# Patient Record
Sex: FEMALE | Race: WHITE | NOT HISPANIC OR LATINO
[De-identification: names, ages, dates, MRNs, and addresses within clinical notes are randomized per-mention and may not be internally consistent; named-entity substitution may affect disease eponyms.]

---

## 2017-02-04 ENCOUNTER — APPOINTMENT (OUTPATIENT)
Dept: ULTRASOUND IMAGING | Facility: CLINIC | Age: 57
End: 2017-02-04

## 2017-02-04 ENCOUNTER — OUTPATIENT (OUTPATIENT)
Dept: OUTPATIENT SERVICES | Facility: HOSPITAL | Age: 57
LOS: 1 days | End: 2017-02-04
Payer: COMMERCIAL

## 2017-02-04 DIAGNOSIS — Z00.8 ENCOUNTER FOR OTHER GENERAL EXAMINATION: ICD-10-CM

## 2017-02-04 PROCEDURE — 76536 US EXAM OF HEAD AND NECK: CPT

## 2017-05-18 ENCOUNTER — APPOINTMENT (OUTPATIENT)
Dept: OBGYN | Facility: CLINIC | Age: 57
End: 2017-05-18

## 2017-05-18 VITALS
BODY MASS INDEX: 25.52 KG/M2 | WEIGHT: 149.5 LBS | SYSTOLIC BLOOD PRESSURE: 122 MMHG | HEIGHT: 64 IN | DIASTOLIC BLOOD PRESSURE: 80 MMHG

## 2017-05-18 DIAGNOSIS — N95.1 MENOPAUSAL AND FEMALE CLIMACTERIC STATES: ICD-10-CM

## 2018-05-16 ENCOUNTER — OTHER (OUTPATIENT)
Age: 58
End: 2018-05-16

## 2018-05-24 ENCOUNTER — APPOINTMENT (OUTPATIENT)
Dept: OBGYN | Facility: CLINIC | Age: 58
End: 2018-05-24
Payer: COMMERCIAL

## 2018-05-24 VITALS
BODY MASS INDEX: 26.16 KG/M2 | DIASTOLIC BLOOD PRESSURE: 80 MMHG | WEIGHT: 153.25 LBS | HEIGHT: 64 IN | SYSTOLIC BLOOD PRESSURE: 140 MMHG

## 2018-05-24 PROCEDURE — 99396 PREV VISIT EST AGE 40-64: CPT

## 2018-05-24 RX ORDER — AMOXICILLIN AND CLAVULANATE POTASSIUM 875; 125 MG/1; MG/1
875-125 TABLET, COATED ORAL
Qty: 20 | Refills: 0 | Status: DISCONTINUED | COMMUNITY
Start: 2017-01-25

## 2018-05-24 RX ORDER — METRONIDAZOLE 500 MG/1
500 TABLET ORAL
Qty: 15 | Refills: 0 | Status: DISCONTINUED | COMMUNITY
Start: 2017-02-11

## 2018-05-25 LAB — HPV HIGH+LOW RISK DNA PNL CVX: NOT DETECTED

## 2018-05-31 ENCOUNTER — APPOINTMENT (OUTPATIENT)
Dept: GASTROENTEROLOGY | Facility: CLINIC | Age: 58
End: 2018-05-31
Payer: COMMERCIAL

## 2018-05-31 VITALS
BODY MASS INDEX: 26.46 KG/M2 | OXYGEN SATURATION: 99 % | WEIGHT: 155 LBS | HEIGHT: 64 IN | DIASTOLIC BLOOD PRESSURE: 78 MMHG | HEART RATE: 69 BPM | SYSTOLIC BLOOD PRESSURE: 140 MMHG

## 2018-05-31 PROCEDURE — 99204 OFFICE O/P NEW MOD 45 MIN: CPT

## 2018-06-01 LAB — CYTOLOGY CVX/VAG DOC THIN PREP: NORMAL

## 2018-07-02 ENCOUNTER — NON-APPOINTMENT (OUTPATIENT)
Age: 58
End: 2018-07-02

## 2018-07-02 ENCOUNTER — APPOINTMENT (OUTPATIENT)
Dept: CARDIOLOGY | Facility: CLINIC | Age: 58
End: 2018-07-02
Payer: COMMERCIAL

## 2018-07-02 VITALS — SYSTOLIC BLOOD PRESSURE: 150 MMHG | DIASTOLIC BLOOD PRESSURE: 77 MMHG | HEART RATE: 70 BPM | OXYGEN SATURATION: 100 %

## 2018-07-02 PROCEDURE — 93000 ELECTROCARDIOGRAM COMPLETE: CPT

## 2018-07-02 PROCEDURE — 99204 OFFICE O/P NEW MOD 45 MIN: CPT

## 2018-07-11 ENCOUNTER — APPOINTMENT (OUTPATIENT)
Dept: GASTROENTEROLOGY | Facility: AMBULATORY MEDICAL SERVICES | Age: 58
End: 2018-07-11
Payer: COMMERCIAL

## 2018-07-11 PROCEDURE — 45378 DIAGNOSTIC COLONOSCOPY: CPT

## 2018-10-15 ENCOUNTER — LABORATORY RESULT (OUTPATIENT)
Age: 58
End: 2018-10-15

## 2018-10-15 ENCOUNTER — NON-APPOINTMENT (OUTPATIENT)
Age: 58
End: 2018-10-15

## 2018-10-15 ENCOUNTER — APPOINTMENT (OUTPATIENT)
Dept: PULMONOLOGY | Facility: CLINIC | Age: 58
End: 2018-10-15
Payer: COMMERCIAL

## 2018-10-15 VITALS
SYSTOLIC BLOOD PRESSURE: 144 MMHG | DIASTOLIC BLOOD PRESSURE: 87 MMHG | BODY MASS INDEX: 26.46 KG/M2 | HEIGHT: 64 IN | HEART RATE: 75 BPM | TEMPERATURE: 98.3 F | OXYGEN SATURATION: 100 % | WEIGHT: 155 LBS | RESPIRATION RATE: 14 BRPM

## 2018-10-15 VITALS — SYSTOLIC BLOOD PRESSURE: 134 MMHG | DIASTOLIC BLOOD PRESSURE: 88 MMHG

## 2018-10-15 DIAGNOSIS — Z23 ENCOUNTER FOR IMMUNIZATION: ICD-10-CM

## 2018-10-15 DIAGNOSIS — Z87.39 PERSONAL HISTORY OF OTHER DISEASES OF THE MUSCULOSKELETAL SYSTEM AND CONNECTIVE TISSUE: ICD-10-CM

## 2018-10-15 LAB
BILIRUB UR QL STRIP: NEGATIVE
CLARITY UR: CLEAR
COLLECTION METHOD: NORMAL
GLUCOSE UR-MCNC: NEGATIVE
HCG UR QL: 0.2 EU/DL
HGB UR QL STRIP.AUTO: NEGATIVE
KETONES UR-MCNC: NEGATIVE
LEUKOCYTE ESTERASE UR QL STRIP: NEGATIVE
NITRITE UR QL STRIP: NEGATIVE
PH UR STRIP: 5.5
PROT UR STRIP-MCNC: NEGATIVE
SP GR UR STRIP: 1.02

## 2018-10-15 PROCEDURE — 93000 ELECTROCARDIOGRAM COMPLETE: CPT

## 2018-10-15 PROCEDURE — 99396 PREV VISIT EST AGE 40-64: CPT | Mod: 25

## 2018-10-15 PROCEDURE — G0008: CPT

## 2018-10-15 PROCEDURE — 77080 DXA BONE DENSITY AXIAL: CPT

## 2018-10-15 PROCEDURE — 36415 COLL VENOUS BLD VENIPUNCTURE: CPT

## 2018-10-15 PROCEDURE — 81003 URINALYSIS AUTO W/O SCOPE: CPT | Mod: QW

## 2018-10-15 PROCEDURE — 90686 IIV4 VACC NO PRSV 0.5 ML IM: CPT

## 2018-10-15 RX ORDER — SODIUM SULFATE, POTASSIUM SULFATE, MAGNESIUM SULFATE 17.5; 3.13; 1.6 G/ML; G/ML; G/ML
17.5-3.13-1.6 SOLUTION, CONCENTRATE ORAL
Qty: 1 | Refills: 0 | Status: DISCONTINUED | COMMUNITY
Start: 2018-05-31 | End: 2018-10-15

## 2018-10-16 LAB
25(OH)D3 SERPL-MCNC: 49.8 NG/ML
ALBUMIN SERPL ELPH-MCNC: 4.8 G/DL
ALP BLD-CCNC: 53 U/L
ALT SERPL-CCNC: 14 U/L
ANION GAP SERPL CALC-SCNC: 13 MMOL/L
AST SERPL-CCNC: 21 U/L
BASOPHILS # BLD AUTO: 0.03 K/UL
BASOPHILS NFR BLD AUTO: 0.4 %
BILIRUB DIRECT SERPL-MCNC: 0.1 MG/DL
BILIRUB INDIRECT SERPL-MCNC: 0.4 MG/DL
BILIRUB SERPL-MCNC: 0.5 MG/DL
BUN SERPL-MCNC: 19 MG/DL
CALCIUM SERPL-MCNC: 10 MG/DL
CHLORIDE SERPL-SCNC: 103 MMOL/L
CHOLEST SERPL-MCNC: 227 MG/DL
CHOLEST/HDLC SERPL: 2.5 RATIO
CO2 SERPL-SCNC: 25 MMOL/L
CREAT SERPL-MCNC: 0.8 MG/DL
EOSINOPHIL # BLD AUTO: 0.2 K/UL
EOSINOPHIL NFR BLD AUTO: 3 %
GLUCOSE SERPL-MCNC: 90 MG/DL
HBA1C MFR BLD HPLC: 5.4 %
HCT VFR BLD CALC: 44.1 %
HCV AB SER QL: NONREACTIVE
HCV S/CO RATIO: 0.27 S/CO
HDLC SERPL-MCNC: 91 MG/DL
HGB BLD-MCNC: 14.6 G/DL
IMM GRANULOCYTES NFR BLD AUTO: 0.1 %
LDLC SERPL CALC-MCNC: 118 MG/DL
LYMPHOCYTES # BLD AUTO: 2.68 K/UL
LYMPHOCYTES NFR BLD AUTO: 39.6 %
MAN DIFF?: NORMAL
MCHC RBC-ENTMCNC: 29.4 PG
MCHC RBC-ENTMCNC: 33.1 GM/DL
MCV RBC AUTO: 88.9 FL
MONOCYTES # BLD AUTO: 0.55 K/UL
MONOCYTES NFR BLD AUTO: 8.1 %
NEUTROPHILS # BLD AUTO: 3.29 K/UL
NEUTROPHILS NFR BLD AUTO: 48.8 %
PLATELET # BLD AUTO: 460 K/UL
POTASSIUM SERPL-SCNC: 4.5 MMOL/L
PROT SERPL-MCNC: 7.7 G/DL
RBC # BLD: 4.96 M/UL
RBC # FLD: 14.5 %
SODIUM SERPL-SCNC: 141 MMOL/L
T3 SERPL-MCNC: 110 NG/DL
T3RU NFR SERPL: 1 INDEX
T4 FREE SERPL-MCNC: 1.4 NG/DL
T4 SERPL-MCNC: 7.3 UG/DL
TRIGL SERPL-MCNC: 91 MG/DL
TSH SERPL-ACNC: 3.85 UIU/ML
WBC # FLD AUTO: 6.76 K/UL

## 2019-05-17 ENCOUNTER — OTHER (OUTPATIENT)
Age: 59
End: 2019-05-17

## 2019-05-29 ENCOUNTER — TRANSCRIPTION ENCOUNTER (OUTPATIENT)
Age: 59
End: 2019-05-29

## 2019-05-29 ENCOUNTER — APPOINTMENT (OUTPATIENT)
Dept: OBGYN | Facility: CLINIC | Age: 59
End: 2019-05-29
Payer: COMMERCIAL

## 2019-05-29 VITALS
DIASTOLIC BLOOD PRESSURE: 80 MMHG | SYSTOLIC BLOOD PRESSURE: 110 MMHG | HEIGHT: 64 IN | BODY MASS INDEX: 27.17 KG/M2 | WEIGHT: 159.13 LBS

## 2019-05-29 PROCEDURE — 99396 PREV VISIT EST AGE 40-64: CPT

## 2019-05-29 NOTE — HISTORY OF PRESENT ILLNESS
[Last Mammogram ___] : Last Mammogram was [unfilled] [Last Bone Density ___] : Last bone density studies [unfilled] [Last Colonoscopy ___] : Last colonoscopy [unfilled] [Last Pap ___] : Last cervical pap smear was [unfilled]

## 2019-08-10 ENCOUNTER — TRANSCRIPTION ENCOUNTER (OUTPATIENT)
Age: 59
End: 2019-08-10

## 2019-10-09 ENCOUNTER — APPOINTMENT (OUTPATIENT)
Dept: ORTHOPEDIC SURGERY | Facility: CLINIC | Age: 59
End: 2019-10-09
Payer: COMMERCIAL

## 2019-10-09 DIAGNOSIS — Z78.9 OTHER SPECIFIED HEALTH STATUS: ICD-10-CM

## 2019-10-09 DIAGNOSIS — M25.562 PAIN IN LEFT KNEE: ICD-10-CM

## 2019-10-09 PROCEDURE — 99203 OFFICE O/P NEW LOW 30 MIN: CPT

## 2019-10-09 PROCEDURE — 73560 X-RAY EXAM OF KNEE 1 OR 2: CPT | Mod: RT

## 2019-10-09 PROCEDURE — 73564 X-RAY EXAM KNEE 4 OR MORE: CPT | Mod: LT

## 2019-10-09 NOTE — ADDENDUM
[FreeTextEntry1] : This note was written by Carla Merrill on 10/09/2019 acting as scribe for Dr. Parvez Eckert M.D.\par \par I, Dr. Parvez Eckert M.D., have read and attest that all the information, medical decision making and discharge instructions within are true and accurate.\par

## 2019-10-09 NOTE — HISTORY OF PRESENT ILLNESS
[de-identified] : 59 year old female presents for initial evaluation of left knee pain left knee symptoms for about a year. Patient denies any specific injury. She describes the pain as located laterally, sharp with increased pain on stairs,when squatting and when bending. She does report swelling  but no other symptoms. She walks as needed and takes the stairs one at a time using the handrail. She currently takes Advil or Aleve prn and uses a warm compress with some improvement. She has seen Dr. Strickland in Feb 2019 and was given a cortisone injection to left knee with improvement. She also does exercise with walking and using the elliptical. \par

## 2019-10-09 NOTE — DISCUSSION/SUMMARY
[de-identified] : Discussed at length the nature of the patient’s condition. 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 At this point I think that the best option is to begin a course of physiotherapy following the anterior knee pain program and focusing on flexibility. I have recommended weight loss, Yoga and Pilates, as well as daily stretching. She may take Advil prn and continue her normal activities as tolerated. If she continues to be symptomatic she may return for follow up in 6-8 weeks.

## 2019-10-09 NOTE — PHYSICAL EXAM
[de-identified] : General appearance: well nourished and hydrated, pleasant, alert and oriented x 3, cooperative.\par HEENT: Normocephalic, EOM intact, Nasal septum midline, Oral cavity clear, External auditory canal clear.\par Cardiovascular: no apparent abnormalities, mild bilateral lower leg edema, no varicosities, pedal pulses are palpable.\par Lymphatics Lymph nodes: none palpated, Lymphedema: not present.\par Neurologic: sensation is normal, no muscle weakness in upper or lower extremities, patella tendon reflexes intact .\par Dermatologic no apparent skin lesions, moist, warm, no rash.\par Spine: cervical spine appears normal and moves freely, thoracic spine appears normal and moves freely, lumbosacral spine appears normal and moves freely.\par Gait: nonantalgic.\par \par Left knee\par Inspection: no effusion or erythema.\par Wounds: none.\par Alignment: normal.\par Palpation: no specific tenderness on palpation.\par ROM active (in  0-135 with patellofemoral clicking \par Ligamentous laxity: all ligaments appear stable,, negative ant. drawer test, negative post. drawer test, stable to varus stress test, stable to valgus stress test. negative Lachman's test, negative pivot shift test\par Meniscal Test: negative McMurrays, negative Kyleigh.\par Patellofemoral Alignment Test: Q angle-, normal.\par Muscle Test: good quad strength.\par Leg examination: calf is soft and non-tender.\par tight hamstring, popliteal angle 60 degrees, positive Daniel test, tight IT band\par \par Right knee\par Inspection: no effusion or erythema.\par Wounds: none.\par Alignment: normal.\par Palpation: no specific tenderness on palpation.\par ROM active (in degrees): 0-135 with patellofemoral clicking \par Ligamentous laxity: all ligaments appear stable,, negative ant. drawer test, negative post. drawer test, stable to varus stress test, stable to valgus stress test. negative Lachman's test, negative pivot shift test\par Meniscal Test: negative McMurrays, negative Kyleigh.\par Patellofemoral Alignment Test: Q angle-, normal.\par Muscle Test: good quad strength.\par Leg examination: calf is soft and non-tender.\par tight hamstring, popliteal angle 60 degrees, positive Daniel test, tight IT band \par \par Left hip\par Inspection: No swelling or ecchymosis.\par Wounds: none.\par Palpation: non-tender.\par Stability: no instability.\par Strength: 5/5 all motor groups.\par ROM: no pain with FROM.\par Leg length: equal.\par \par Right hip\par Inspection: No swelling or ecchymosis.\par Wounds: none.\par Palpation: non-tender.\par Stability: no instability.\par Strength: 5/5 all motor groups.\par ROM: no pain with FROM.\par Leg length: equal.\par \par Left ankle\par Inspection: no erythema noted, no swelling noted.\par Palpation: no pain on palpation .\par ROM: FROM without crepitus.\par Muscle strength: 5/5.\par Stability: no instability noted.\par \par Right ankle\par Inspection: no erythema noted, no swelling noted.\par ROM: FROM without crepitus.\par Palpation: no pain on palpation .\par Muscle strength: 5/5.\par Stability: no instability noted.\par \par Left foot\par Inspection: color, texture and turgor are normal.\par ROM: full range of motion of all joints without pain or crepitus.\par Palpation: no tenderness.\par Stability: no instability noted.\par \par Right foot\par Inspection: color, texture and turgor are normal.\par ROM: full range of motion of all joints without pain or crepitus.\par Palpation: no tenderness.\par Stability: no instability noted. [de-identified] : Left knee xrays, standing AP/Lateral, Merchant, and 45 degree PA standing view, taken at the office today shows normal alignment, good joint space maintained, patella sits at an appropriate height in a central position\par \par Right knee xray merchant view, taken at the office today demonstrates good joint space and a well centered patella. \par \par

## 2019-10-25 ENCOUNTER — APPOINTMENT (OUTPATIENT)
Dept: PULMONOLOGY | Facility: CLINIC | Age: 59
End: 2019-10-25

## 2019-11-20 ENCOUNTER — APPOINTMENT (OUTPATIENT)
Dept: ORTHOPEDIC SURGERY | Facility: CLINIC | Age: 59
End: 2019-11-20
Payer: COMMERCIAL

## 2019-11-20 DIAGNOSIS — S83.282A OTHER TEAR OF LATERAL MENISCUS, CURRENT INJURY, LEFT KNEE, INITIAL ENCOUNTER: ICD-10-CM

## 2019-11-20 DIAGNOSIS — M22.42 CHONDROMALACIA PATELLAE, LEFT KNEE: ICD-10-CM

## 2019-11-20 PROCEDURE — 73560 X-RAY EXAM OF KNEE 1 OR 2: CPT | Mod: RT

## 2019-11-20 PROCEDURE — 73564 X-RAY EXAM KNEE 4 OR MORE: CPT | Mod: LT

## 2019-11-20 PROCEDURE — 99214 OFFICE O/P EST MOD 30 MIN: CPT

## 2019-11-20 NOTE — ADDENDUM
[FreeTextEntry1] : This note was written by Teri Moreno on 11/20/2019 acting as scribe for Dr. Parvez Eckert M.D.\par \par I, Dr. Parvez Eckert M.D., have read and attest that all the information, medical decision making and discharge instructions within are true and accurate.

## 2019-11-20 NOTE — PHYSICAL EXAM
[de-identified] : Left knee\par Inspection: no effusion or erythema, soft tissue swelling noted along the lateral joint line consistent with possible synovial cyst\par Wounds: none.\par Alignment: normal.\par Palpation: no specific tenderness on palpation.\par ROM active (in  0-135 with patellofemoral clicking \par Ligamentous laxity: all ligaments appear stable,, negative ant. drawer test, negative post. drawer test, stable to varus stress test, stable to valgus stress test. negative Lachman's test, negative pivot shift test\par Meniscal Test: negative McMurrays, negative Kyleigh.\par Patellofemoral Alignment Test: Q angle-, normal.\par Muscle Test: good quad strength.\par Leg examination: calf is soft and non-tender.\par tight hamstring, popliteal angle 90 degrees, positive Daniel test, tight IT band  [de-identified] : Left knee xrays, standing AP/Lateral, Merchant, and 45 degree PA standing view, taken at the office today shows normal alignment, good joint space maintained, patella sits at an appropriate height in a central position\par \par Right knee xray merchant view, taken at the office today demonstrates good joint space and a well centered patella. \par \par

## 2019-11-20 NOTE — HISTORY OF PRESENT ILLNESS
[de-identified] : 59 year old female presents for follow up evaluation of left knee pain. She has been doing PT for 4 weeks and denies using any medications. Patient feels she has had some improvement but continues to report pain with transfers from a low seat. She locates most of her pain in the front of her knee with squatting as well as stairs.

## 2019-12-04 ENCOUNTER — OUTPATIENT (OUTPATIENT)
Dept: OUTPATIENT SERVICES | Facility: HOSPITAL | Age: 59
LOS: 1 days | End: 2019-12-04
Payer: COMMERCIAL

## 2019-12-04 ENCOUNTER — APPOINTMENT (OUTPATIENT)
Dept: MRI IMAGING | Facility: CLINIC | Age: 59
End: 2019-12-04
Payer: COMMERCIAL

## 2019-12-04 DIAGNOSIS — M25.562 PAIN IN LEFT KNEE: ICD-10-CM

## 2019-12-04 PROCEDURE — 73721 MRI JNT OF LWR EXTRE W/O DYE: CPT

## 2019-12-04 PROCEDURE — 73721 MRI JNT OF LWR EXTRE W/O DYE: CPT | Mod: 26,LT

## 2019-12-09 ENCOUNTER — RESULT REVIEW (OUTPATIENT)
Age: 59
End: 2019-12-09

## 2019-12-16 ENCOUNTER — LABORATORY RESULT (OUTPATIENT)
Age: 59
End: 2019-12-16

## 2019-12-16 ENCOUNTER — APPOINTMENT (OUTPATIENT)
Dept: PULMONOLOGY | Facility: CLINIC | Age: 59
End: 2019-12-16
Payer: COMMERCIAL

## 2019-12-16 ENCOUNTER — NON-APPOINTMENT (OUTPATIENT)
Age: 59
End: 2019-12-16

## 2019-12-16 VITALS — SYSTOLIC BLOOD PRESSURE: 140 MMHG | DIASTOLIC BLOOD PRESSURE: 80 MMHG | HEART RATE: 70 BPM | OXYGEN SATURATION: 100 %

## 2019-12-16 DIAGNOSIS — Z86.010 PERSONAL HISTORY OF COLONIC POLYPS: ICD-10-CM

## 2019-12-16 LAB
ALBUMIN: 10
BILIRUB UR QL STRIP: NORMAL
CLARITY UR: CLEAR
COLLECTION METHOD: NORMAL
CREATININE: 50
GLUCOSE UR-MCNC: NORMAL
HCG UR QL: 0.2 EU/DL
HGB UR QL STRIP.AUTO: NORMAL
KETONES UR-MCNC: NORMAL
LEUKOCYTE ESTERASE UR QL STRIP: NORMAL
MICROALBUMIN/CREAT UR TEST STR-RTO: 30
NITRITE UR QL STRIP: NORMAL
PH UR STRIP: 5.5
PROT UR STRIP-MCNC: NORMAL
SP GR UR STRIP: 1

## 2019-12-16 PROCEDURE — 82044 UR ALBUMIN SEMIQUANTITATIVE: CPT | Mod: QW

## 2019-12-16 PROCEDURE — 81003 URINALYSIS AUTO W/O SCOPE: CPT | Mod: QW

## 2019-12-16 PROCEDURE — 99396 PREV VISIT EST AGE 40-64: CPT | Mod: 25

## 2019-12-16 PROCEDURE — 93000 ELECTROCARDIOGRAM COMPLETE: CPT

## 2019-12-16 PROCEDURE — 36415 COLL VENOUS BLD VENIPUNCTURE: CPT

## 2019-12-16 NOTE — PHYSICAL EXAM
[General Appearance - Well Developed] : well developed [General Appearance - Well Nourished] : well nourished [Normal Oropharynx] : normal oropharynx [Normal Conjunctiva] : the conjunctiva exhibited no abnormalities [Thyroid Nodule] : there were no palpable thyroid nodules [Jugular Venous Distention Increased] : there was no jugular-venous distention [Thyroid Diffuse Enlargement] : the thyroid was not enlarged [Murmurs] : no murmurs present [Heart Sounds] : normal S1 and S2 [Auscultation Breath Sounds / Voice Sounds] : lungs were clear to auscultation bilaterally [Exaggerated Use Of Accessory Muscles For Inspiration] : no accessory muscle use [Respiration, Rhythm And Depth] : normal respiratory rhythm and effort [Abdomen Tenderness] : non-tender [Abdomen Soft] : soft [Nail Clubbing] : no clubbing of the fingernails [Cyanosis, Localized] : no localized cyanosis [Abdomen Mass (___ Cm)] : no abdominal mass palpated [] : no rash [Skin Color & Pigmentation] : normal skin color and pigmentation [Petechial Hemorrhages (___cm)] : no petechial hemorrhages [No Venous Stasis] : no venous stasis [Skin Lesions] : no skin lesions [Deep Tendon Reflexes (DTR)] : deep tendon reflexes were 2+ and symmetric [No Skin Ulcers] : no skin ulcer [No Xanthoma] : no  xanthoma was observed [Sensation] : the sensory exam was normal to light touch and pinprick [No Focal Deficits] : no focal deficits [FreeTextEntry2] : trace edema left

## 2019-12-16 NOTE — HISTORY OF PRESENT ILLNESS
[FreeTextEntry1] : feeling well. Prn palpitations less often . Had coloscopy over summer. Saw GYN and had mammography\par Saw cardiologist and started to take b/p at home over summer and was told the readings were ok\par BD  last year. \par \par optho Y\par Derm Y\par \par Had varicose vein procedure this summer Dr. Krause on left.

## 2019-12-16 NOTE — ASSESSMENT
[FreeTextEntry1] : Consider low dose beta blocker\par To follow BP home\par Labs drawn in office today\par \par

## 2019-12-17 ENCOUNTER — OTHER (OUTPATIENT)
Age: 59
End: 2019-12-17

## 2019-12-17 ENCOUNTER — MOBILE ON CALL (OUTPATIENT)
Age: 59
End: 2019-12-17

## 2019-12-17 LAB
25(OH)D3 SERPL-MCNC: 56 NG/ML
ALBUMIN SERPL ELPH-MCNC: 4.9 G/DL
ALP BLD-CCNC: 55 U/L
ALT SERPL-CCNC: 33 U/L
ANION GAP SERPL CALC-SCNC: 12 MMOL/L
AST SERPL-CCNC: 26 U/L
BASOPHILS # BLD AUTO: 0.05 K/UL
BASOPHILS NFR BLD AUTO: 0.6 %
BILIRUB DIRECT SERPL-MCNC: 0.1 MG/DL
BILIRUB INDIRECT SERPL-MCNC: 0.1 MG/DL
BILIRUB SERPL-MCNC: 0.2 MG/DL
BUN SERPL-MCNC: 21 MG/DL
CALCIUM SERPL-MCNC: 10 MG/DL
CHLORIDE SERPL-SCNC: 104 MMOL/L
CHOLEST SERPL-MCNC: 231 MG/DL
CHOLEST/HDLC SERPL: 2.1 RATIO
CO2 SERPL-SCNC: 26 MMOL/L
CREAT SERPL-MCNC: 0.67 MG/DL
EOSINOPHIL # BLD AUTO: 0.24 K/UL
EOSINOPHIL NFR BLD AUTO: 3 %
ESTIMATED AVERAGE GLUCOSE: 108 MG/DL
GLUCOSE SERPL-MCNC: 96 MG/DL
HBA1C MFR BLD HPLC: 5.4 %
HCT VFR BLD CALC: 44.3 %
HCV AB SER QL: NONREACTIVE
HCV S/CO RATIO: 0.19 S/CO
HDLC SERPL-MCNC: 111 MG/DL
HGB BLD-MCNC: 14 G/DL
IMM GRANULOCYTES NFR BLD AUTO: 0.2 %
LDLC SERPL CALC-MCNC: 108 MG/DL
LYMPHOCYTES # BLD AUTO: 3.4 K/UL
LYMPHOCYTES NFR BLD AUTO: 41.8 %
MAN DIFF?: NORMAL
MCHC RBC-ENTMCNC: 28.2 PG
MCHC RBC-ENTMCNC: 31.6 GM/DL
MCV RBC AUTO: 89.3 FL
MONOCYTES # BLD AUTO: 0.84 K/UL
MONOCYTES NFR BLD AUTO: 10.3 %
NEUTROPHILS # BLD AUTO: 3.58 K/UL
NEUTROPHILS NFR BLD AUTO: 44.1 %
PLATELET # BLD AUTO: 504 K/UL
POTASSIUM SERPL-SCNC: 5.1 MMOL/L
PROT SERPL-MCNC: 7.2 G/DL
RBC # BLD: 4.96 M/UL
RBC # FLD: 14.9 %
SODIUM SERPL-SCNC: 142 MMOL/L
T3 SERPL-MCNC: 112 NG/DL
T3RU NFR SERPL: 1 TBI
T4 FREE SERPL-MCNC: 1.2 NG/DL
T4 SERPL-MCNC: 7.1 UG/DL
TRIGL SERPL-MCNC: 62 MG/DL
TSH SERPL-ACNC: 4.56 UIU/ML
WBC # FLD AUTO: 8.13 K/UL

## 2020-01-07 ENCOUNTER — APPOINTMENT (OUTPATIENT)
Dept: OBGYN | Facility: CLINIC | Age: 60
End: 2020-01-07
Payer: COMMERCIAL

## 2020-01-07 VITALS
DIASTOLIC BLOOD PRESSURE: 80 MMHG | WEIGHT: 159 LBS | HEIGHT: 64 IN | SYSTOLIC BLOOD PRESSURE: 120 MMHG | BODY MASS INDEX: 27.14 KG/M2

## 2020-01-07 PROCEDURE — 99213 OFFICE O/P EST LOW 20 MIN: CPT

## 2020-02-19 ENCOUNTER — APPOINTMENT (OUTPATIENT)
Dept: CARDIOLOGY | Facility: CLINIC | Age: 60
End: 2020-02-19
Payer: COMMERCIAL

## 2020-02-19 ENCOUNTER — NON-APPOINTMENT (OUTPATIENT)
Age: 60
End: 2020-02-19

## 2020-02-19 VITALS
HEIGHT: 64 IN | BODY MASS INDEX: 29.18 KG/M2 | HEART RATE: 74 BPM | DIASTOLIC BLOOD PRESSURE: 84 MMHG | SYSTOLIC BLOOD PRESSURE: 145 MMHG | OXYGEN SATURATION: 100 %

## 2020-02-19 VITALS — BODY MASS INDEX: 29.18 KG/M2 | WEIGHT: 170 LBS

## 2020-02-19 PROCEDURE — 99213 OFFICE O/P EST LOW 20 MIN: CPT

## 2020-02-19 PROCEDURE — 93000 ELECTROCARDIOGRAM COMPLETE: CPT

## 2020-02-19 NOTE — REASON FOR VISIT
[Initial Evaluation] : an initial evaluation of [Palpitations] : palpitations [FreeTextEntry1] : This is a tamika 59-year-old woman here for evaluation of palpitations.  The patient has had palpitations for the last several years, but they have been progressive over the last several months.  They occur several times weekly, and almost exclusively at night.  She does not experience any associated chest pain or shortness of breath, and does not experience dizziness.  They are fleeting in nature, lasting only seconds.\par \par Detailed dietary history reveals minimal caffeine intake, and the patient has not been exercising regularly in approximately 6 months.\par \par 1.  Palpitations.  There is no evidence of structural heart disease on the patient's exam or ECG.  I do not believe she has malignant dysrhythmia.  I suspect this is mostly anxiety driven.  I recommended regular exercise 3 times weekly after which we will reevaluate her symptoms.\par

## 2020-02-19 NOTE — ASSESSMENT
[FreeTextEntry1] : 1.  Palpitations.  There is no evidence of structural heart disease on the patient's exam or ECG.  I do not believe she has malignant dysrhythmia.  I suspect this is mostly anxiety driven.  I recommended regular exercise 3 times weekly after which we will reevaluate her symptoms.\par

## 2020-02-19 NOTE — PHYSICAL EXAM
[General Appearance - Well Developed] : well developed [Normal Appearance] : normal appearance [Well Groomed] : well groomed [General Appearance - Well Nourished] : well nourished [No Deformities] : no deformities [General Appearance - In No Acute Distress] : no acute distress [Eyelids - No Xanthelasma] : the eyelids demonstrated no xanthelasmas [Normal Conjunctiva] : the conjunctiva exhibited no abnormalities [Normal Oral Mucosa] : normal oral mucosa [No Oral Pallor] : no oral pallor [No Oral Cyanosis] : no oral cyanosis [Normal Jugular Venous V Waves Present] : normal jugular venous V waves present [Normal Jugular Venous A Waves Present] : normal jugular venous A waves present [No Jugular Venous Cherry A Waves] : no jugular venous cherry A waves [Heart Sounds] : normal S1 and S2 [Heart Rate And Rhythm] : heart rate and rhythm were normal [Murmurs] : no murmurs present [Exaggerated Use Of Accessory Muscles For Inspiration] : no accessory muscle use [Respiration, Rhythm And Depth] : normal respiratory rhythm and effort [Auscultation Breath Sounds / Voice Sounds] : lungs were clear to auscultation bilaterally [Abdomen Tenderness] : non-tender [Abdomen Soft] : soft [Abdomen Mass (___ Cm)] : no abdominal mass palpated [Abnormal Walk] : normal gait [Nail Clubbing] : no clubbing of the fingernails [Gait - Sufficient For Exercise Testing] : the gait was sufficient for exercise testing [Petechial Hemorrhages (___cm)] : no petechial hemorrhages [Cyanosis, Localized] : no localized cyanosis [No Venous Stasis] : no venous stasis [Skin Color & Pigmentation] : normal skin color and pigmentation [] : no rash [Skin Lesions] : no skin lesions [No Xanthoma] : no  xanthoma was observed [No Skin Ulcers] : no skin ulcer [Mood] : the mood was normal [Affect] : the affect was normal [Oriented To Time, Place, And Person] : oriented to person, place, and time [No Anxiety] : not feeling anxious

## 2020-03-16 ENCOUNTER — APPOINTMENT (OUTPATIENT)
Dept: PULMONOLOGY | Facility: CLINIC | Age: 60
End: 2020-03-16

## 2020-06-10 ENCOUNTER — APPOINTMENT (OUTPATIENT)
Dept: OBGYN | Facility: CLINIC | Age: 60
End: 2020-06-10
Payer: COMMERCIAL

## 2020-06-10 VITALS
BODY MASS INDEX: 28.25 KG/M2 | HEIGHT: 64 IN | SYSTOLIC BLOOD PRESSURE: 145 MMHG | WEIGHT: 165.5 LBS | DIASTOLIC BLOOD PRESSURE: 87 MMHG

## 2020-06-10 DIAGNOSIS — Z87.42 PERSONAL HISTORY OF OTHER DISEASES OF THE FEMALE GENITAL TRACT: ICD-10-CM

## 2020-06-10 PROCEDURE — 99396 PREV VISIT EST AGE 40-64: CPT

## 2020-06-16 ENCOUNTER — LABORATORY RESULT (OUTPATIENT)
Age: 60
End: 2020-06-16

## 2020-06-16 ENCOUNTER — APPOINTMENT (OUTPATIENT)
Dept: PULMONOLOGY | Facility: CLINIC | Age: 60
End: 2020-06-16
Payer: COMMERCIAL

## 2020-06-16 VITALS — SYSTOLIC BLOOD PRESSURE: 124 MMHG | DIASTOLIC BLOOD PRESSURE: 82 MMHG

## 2020-06-16 VITALS
DIASTOLIC BLOOD PRESSURE: 85 MMHG | HEART RATE: 87 BPM | OXYGEN SATURATION: 100 % | SYSTOLIC BLOOD PRESSURE: 132 MMHG | RESPIRATION RATE: 16 BRPM | TEMPERATURE: 97.8 F

## 2020-06-16 PROCEDURE — 99213 OFFICE O/P EST LOW 20 MIN: CPT | Mod: 25

## 2020-06-16 PROCEDURE — 36415 COLL VENOUS BLD VENIPUNCTURE: CPT

## 2020-06-16 NOTE — ASSESSMENT
[FreeTextEntry1] : \par will take b/p once a week until seeing cardiologist\par Labs drawn in office today\par \par

## 2020-06-16 NOTE — HISTORY OF PRESENT ILLNESS
[TextBox_4] : Feeling well did have a URI in feb and want a covid antibody test. Saw cardiologist for palpitation and repeat visit set for July , having less palpitations recently.

## 2020-06-16 NOTE — DISCUSSION/SUMMARY
[FreeTextEntry1] : Borderline HTN\par Occ palpitations has seen cardio\par Osteoporosis\par borderline thyroid and cholesterol

## 2020-06-16 NOTE — PHYSICAL EXAM
[General Appearance - Well Nourished] : well nourished [General Appearance - Well Developed] : well developed [Normal Conjunctiva] : the conjunctiva exhibited no abnormalities [Normal Oropharynx] : normal oropharynx [Jugular Venous Distention Increased] : there was no jugular-venous distention [Thyroid Diffuse Enlargement] : the thyroid was not enlarged [Thyroid Nodule] : there were no palpable thyroid nodules [Murmurs] : no murmurs present [Heart Sounds] : normal S1 and S2 [Respiration, Rhythm And Depth] : normal respiratory rhythm and effort [Auscultation Breath Sounds / Voice Sounds] : lungs were clear to auscultation bilaterally [Exaggerated Use Of Accessory Muscles For Inspiration] : no accessory muscle use [Abdomen Tenderness] : non-tender [Abdomen Soft] : soft [Abdomen Mass (___ Cm)] : no abdominal mass palpated [Cyanosis, Localized] : no localized cyanosis [Nail Clubbing] : no clubbing of the fingernails [Petechial Hemorrhages (___cm)] : no petechial hemorrhages [] : no rash [Skin Color & Pigmentation] : normal skin color and pigmentation [No Venous Stasis] : no venous stasis [No Skin Ulcers] : no skin ulcer [No Xanthoma] : no  xanthoma was observed [Skin Lesions] : no skin lesions [Deep Tendon Reflexes (DTR)] : deep tendon reflexes were 2+ and symmetric [Sensation] : the sensory exam was normal to light touch and pinprick [No Focal Deficits] : no focal deficits [FreeTextEntry2] : trace edema left

## 2020-06-17 LAB
ALBUMIN SERPL ELPH-MCNC: 4.9 G/DL
ALP BLD-CCNC: 55 U/L
ALT SERPL-CCNC: 22 U/L
ANION GAP SERPL CALC-SCNC: 13 MMOL/L
AST SERPL-CCNC: 20 U/L
BASOPHILS # BLD AUTO: 0.06 K/UL
BASOPHILS NFR BLD AUTO: 0.9 %
BILIRUB DIRECT SERPL-MCNC: 0.1 MG/DL
BILIRUB INDIRECT SERPL-MCNC: 0.3 MG/DL
BILIRUB SERPL-MCNC: 0.5 MG/DL
BUN SERPL-MCNC: 21 MG/DL
CALCIUM SERPL-MCNC: 10.5 MG/DL
CHLORIDE SERPL-SCNC: 102 MMOL/L
CHOLEST SERPL-MCNC: 241 MG/DL
CHOLEST/HDLC SERPL: 2.3 RATIO
CO2 SERPL-SCNC: 27 MMOL/L
CREAT SERPL-MCNC: 0.71 MG/DL
EOSINOPHIL # BLD AUTO: 0.21 K/UL
EOSINOPHIL NFR BLD AUTO: 3.1 %
GLUCOSE SERPL-MCNC: 94 MG/DL
HCT VFR BLD CALC: 43.4 %
HDLC SERPL-MCNC: 107 MG/DL
HGB BLD-MCNC: 14 G/DL
IMM GRANULOCYTES NFR BLD AUTO: 0.3 %
LDLC SERPL CALC-MCNC: 125 MG/DL
LYMPHOCYTES # BLD AUTO: 2.45 K/UL
LYMPHOCYTES NFR BLD AUTO: 35.7 %
MAN DIFF?: NORMAL
MCHC RBC-ENTMCNC: 28.3 PG
MCHC RBC-ENTMCNC: 32.3 GM/DL
MCV RBC AUTO: 87.7 FL
MONOCYTES # BLD AUTO: 0.59 K/UL
MONOCYTES NFR BLD AUTO: 8.6 %
NEUTROPHILS # BLD AUTO: 3.53 K/UL
NEUTROPHILS NFR BLD AUTO: 51.4 %
PLATELET # BLD AUTO: 510 K/UL
POTASSIUM SERPL-SCNC: 5.5 MMOL/L
PROT SERPL-MCNC: 7.3 G/DL
RBC # BLD: 4.95 M/UL
RBC # FLD: 14.3 %
SARS-COV-2 IGG SERPL IA-ACNC: 0.01 INDEX
SARS-COV-2 IGG SERPL QL IA: NEGATIVE
SODIUM SERPL-SCNC: 142 MMOL/L
T3 SERPL-MCNC: 112 NG/DL
T3RU NFR SERPL: 0.5 TBI
T4 FREE SERPL-MCNC: 1.3 NG/DL
T4 SERPL-MCNC: 7.9 UG/DL
TRIGL SERPL-MCNC: 45 MG/DL
TSH SERPL-ACNC: 3.38 UIU/ML
WBC # FLD AUTO: 6.86 K/UL

## 2020-06-29 ENCOUNTER — OUTPATIENT (OUTPATIENT)
Dept: OUTPATIENT SERVICES | Facility: HOSPITAL | Age: 60
LOS: 1 days | Discharge: HOME | End: 2020-06-29

## 2020-06-29 DIAGNOSIS — D72.829 ELEVATED WHITE BLOOD CELL COUNT, UNSPECIFIED: ICD-10-CM

## 2020-06-30 ENCOUNTER — RESULT REVIEW (OUTPATIENT)
Age: 60
End: 2020-06-30

## 2020-06-30 ENCOUNTER — APPOINTMENT (OUTPATIENT)
Dept: HEMATOLOGY ONCOLOGY | Facility: CLINIC | Age: 60
End: 2020-06-30
Payer: COMMERCIAL

## 2020-06-30 VITALS
SYSTOLIC BLOOD PRESSURE: 144 MMHG | OXYGEN SATURATION: 99 % | HEART RATE: 84 BPM | TEMPERATURE: 98.4 F | HEIGHT: 64.8 IN | RESPIRATION RATE: 16 BRPM | WEIGHT: 166.23 LBS | DIASTOLIC BLOOD PRESSURE: 90 MMHG | BODY MASS INDEX: 27.7 KG/M2

## 2020-06-30 LAB
BASOPHILS # BLD AUTO: 0.05 K/UL — SIGNIFICANT CHANGE UP (ref 0–0.2)
BASOPHILS NFR BLD AUTO: 0.6 % — SIGNIFICANT CHANGE UP (ref 0–2)
EOSINOPHIL # BLD AUTO: 0.38 K/UL — SIGNIFICANT CHANGE UP (ref 0–0.5)
EOSINOPHIL NFR BLD AUTO: 4.8 % — SIGNIFICANT CHANGE UP (ref 0–6)
ERYTHROCYTE [SEDIMENTATION RATE] IN BLOOD: 4 MM/HR — SIGNIFICANT CHANGE UP (ref 0–20)
HCT VFR BLD CALC: 41.3 % — SIGNIFICANT CHANGE UP (ref 34.5–45)
HGB BLD-MCNC: 13.8 G/DL — SIGNIFICANT CHANGE UP (ref 11.5–15.5)
IMM GRANULOCYTES NFR BLD AUTO: 0.6 % — SIGNIFICANT CHANGE UP (ref 0–1.5)
LYMPHOCYTES # BLD AUTO: 3.03 K/UL — SIGNIFICANT CHANGE UP (ref 1–3.3)
LYMPHOCYTES # BLD AUTO: 38.5 % — SIGNIFICANT CHANGE UP (ref 13–44)
MCHC RBC-ENTMCNC: 28.7 PG — SIGNIFICANT CHANGE UP (ref 27–34)
MCHC RBC-ENTMCNC: 33.4 GM/DL — SIGNIFICANT CHANGE UP (ref 32–36)
MCV RBC AUTO: 85.9 FL — SIGNIFICANT CHANGE UP (ref 80–100)
MONOCYTES # BLD AUTO: 0.72 K/UL — SIGNIFICANT CHANGE UP (ref 0–0.9)
MONOCYTES NFR BLD AUTO: 9.1 % — SIGNIFICANT CHANGE UP (ref 2–14)
NEUTROPHILS # BLD AUTO: 3.65 K/UL — SIGNIFICANT CHANGE UP (ref 1.8–7.4)
NEUTROPHILS NFR BLD AUTO: 46.4 % — SIGNIFICANT CHANGE UP (ref 43–77)
NRBC # BLD: 0 /100 WBCS — SIGNIFICANT CHANGE UP (ref 0–0)
PLATELET # BLD AUTO: 481 K/UL — HIGH (ref 150–400)
RBC # BLD: 4.81 M/UL — SIGNIFICANT CHANGE UP (ref 3.8–5.2)
RBC # FLD: 14.5 % — SIGNIFICANT CHANGE UP (ref 10.3–14.5)
WBC # BLD: 7.88 K/UL — SIGNIFICANT CHANGE UP (ref 3.8–10.5)
WBC # FLD AUTO: 7.88 K/UL — SIGNIFICANT CHANGE UP (ref 3.8–10.5)

## 2020-06-30 PROCEDURE — 99205 OFFICE O/P NEW HI 60 MIN: CPT

## 2020-06-30 NOTE — ASSESSMENT
[FreeTextEntry1] : 61yo F w/ borderline HTN here for evaluation of thrombocytosis\par Plt count 481k today, will check for inflammation, iron deficiency. Will also check JAK2, MPL, CALR to assess for MPN\par Patient is on various homeopathic drops, unclear if any relation to thrombocytosis but it has been on hold since beginning of this year. Advised to continue to hold for now\par Will call pt with results, cont to monitor\par RTC 4 mo

## 2020-06-30 NOTE — HISTORY OF PRESENT ILLNESS
[de-identified] : 59yo F w/ borderline HTN here for evaluation of thrombocytosis\par \par Pt reports first being told of this in Dec 2019 - plt count was 504k. She was to have it repeated in a few months but it was delayed secondary to COVID-19 pandemic. She saw her PMD Dr. Ford and labs done on 6/16/20 showed plt count was 510k. It was elevated in 2018 - 460k. \par Pt reports feeling well. Denies any bleeding or bruising. \par She was seeing homeopathic doctor (Dr. Miguel Self [phone]) and using different drops for the last 3 years, last time was around the beginning of this year.  She also uses various supplements. Only medications are Flonase and Premarin cream. \par \par HCM:\par mammo going in July\par pap 2018 -not due this year\par colonoscopy 2018

## 2020-07-02 ENCOUNTER — APPOINTMENT (OUTPATIENT)
Dept: HEMATOLOGY ONCOLOGY | Facility: CLINIC | Age: 60
End: 2020-07-02

## 2020-07-10 LAB
AMINO ACID MPL: NORMAL
ASSAY DETAILS MPL: NORMAL
BLOCK/SPECIMEN ID: NORMAL
CRP SERPL-MCNC: <0.1 MG/DL
EXON MPL: NORMAL
FERRITIN SERPL-MCNC: 220 NG/ML
GENE MPL: NORMAL
GENE XXX MUT ANL BLD/T: NORMAL
INTERPRETATION: NORMAL
IRON SATN MFR SERPL: 24 %
IRON SERPL-MCNC: 82 UG/DL
JAK2 P.V617F BLD/T QL: NEGATIVE
JAK2RLX: NEGATIVE
Lab: NORMAL
MPL EXON 10 MUTATION: NOT DETECTED
MPL SPECIMEN SOURCE: NORMAL
MUTATION TYPE: NORMAL
MUTFREQ: NORMAL
NUCCHA: NORMAL
REFERENCE MPL: NORMAL
TIBC SERPL-MCNC: 339 UG/DL
UIBC SERPL-MCNC: 257 UG/DL

## 2020-07-15 ENCOUNTER — APPOINTMENT (OUTPATIENT)
Dept: CARDIOLOGY | Facility: CLINIC | Age: 60
End: 2020-07-15
Payer: COMMERCIAL

## 2020-07-15 ENCOUNTER — NON-APPOINTMENT (OUTPATIENT)
Age: 60
End: 2020-07-15

## 2020-07-15 VITALS
TEMPERATURE: 98.3 F | RESPIRATION RATE: 16 BRPM | BODY MASS INDEX: 27.46 KG/M2 | OXYGEN SATURATION: 97 % | HEART RATE: 83 BPM | WEIGHT: 164 LBS | DIASTOLIC BLOOD PRESSURE: 83 MMHG | SYSTOLIC BLOOD PRESSURE: 136 MMHG

## 2020-07-15 DIAGNOSIS — R00.2 PALPITATIONS: ICD-10-CM

## 2020-07-15 PROCEDURE — 99214 OFFICE O/P EST MOD 30 MIN: CPT

## 2020-07-15 PROCEDURE — 93000 ELECTROCARDIOGRAM COMPLETE: CPT

## 2020-07-15 NOTE — REASON FOR VISIT
[Follow-Up - Clinic] : a clinic follow-up of [Palpitations] : palpitations [FreeTextEntry1] : Since her last visit, the patient has been doing well.  She has been free from COVID infection.  She has been walking daily and her palpitations have improved.  She has kept a log of her blood pressures which are adequately controlled.\par \par 1.  Palpitations.  These have improved as result of her exercise.  Continue to exercise regularly.\par \par 2.  Elevated blood pressure.  Blood pressure appears well controlled.\par \par Follow-up as needed.

## 2020-07-15 NOTE — ASSESSMENT
[FreeTextEntry1] : 1.  Palpitations.  These have improved as result of her exercise.  Continue to exercise regularly.\par \par 2.  Elevated blood pressure.  Blood pressure appears well controlled.\par \par Follow-up as needed.

## 2020-07-15 NOTE — PHYSICAL EXAM
[Normal Appearance] : normal appearance [General Appearance - Well Developed] : well developed [Well Groomed] : well groomed [General Appearance - Well Nourished] : well nourished [No Deformities] : no deformities [General Appearance - In No Acute Distress] : no acute distress [Normal Conjunctiva] : the conjunctiva exhibited no abnormalities [Eyelids - No Xanthelasma] : the eyelids demonstrated no xanthelasmas [Normal Oral Mucosa] : normal oral mucosa [No Oral Pallor] : no oral pallor [No Oral Cyanosis] : no oral cyanosis [Normal Jugular Venous V Waves Present] : normal jugular venous V waves present [Normal Jugular Venous A Waves Present] : normal jugular venous A waves present [No Jugular Venous Cherry A Waves] : no jugular venous cherry A waves [Respiration, Rhythm And Depth] : normal respiratory rhythm and effort [Exaggerated Use Of Accessory Muscles For Inspiration] : no accessory muscle use [Auscultation Breath Sounds / Voice Sounds] : lungs were clear to auscultation bilaterally [Heart Rate And Rhythm] : heart rate and rhythm were normal [Heart Sounds] : normal S1 and S2 [Murmurs] : no murmurs present [Abdomen Soft] : soft [Abdomen Tenderness] : non-tender [Abdomen Mass (___ Cm)] : no abdominal mass palpated [Abnormal Walk] : normal gait [Nail Clubbing] : no clubbing of the fingernails [Gait - Sufficient For Exercise Testing] : the gait was sufficient for exercise testing [Petechial Hemorrhages (___cm)] : no petechial hemorrhages [Cyanosis, Localized] : no localized cyanosis [Skin Color & Pigmentation] : normal skin color and pigmentation [] : no rash [No Venous Stasis] : no venous stasis [Skin Lesions] : no skin lesions [No Xanthoma] : no  xanthoma was observed [No Skin Ulcers] : no skin ulcer [Affect] : the affect was normal [Oriented To Time, Place, And Person] : oriented to person, place, and time [Mood] : the mood was normal [No Anxiety] : not feeling anxious

## 2020-11-10 ENCOUNTER — APPOINTMENT (OUTPATIENT)
Dept: ORTHOPEDIC SURGERY | Facility: CLINIC | Age: 60
End: 2020-11-10
Payer: COMMERCIAL

## 2020-11-10 VITALS
HEIGHT: 64 IN | HEART RATE: 76 BPM | BODY MASS INDEX: 28 KG/M2 | SYSTOLIC BLOOD PRESSURE: 138 MMHG | DIASTOLIC BLOOD PRESSURE: 90 MMHG | WEIGHT: 164 LBS

## 2020-11-10 DIAGNOSIS — M75.41 IMPINGEMENT SYNDROME OF RIGHT SHOULDER: ICD-10-CM

## 2020-11-10 PROCEDURE — 99072 ADDL SUPL MATRL&STAF TM PHE: CPT

## 2020-11-10 PROCEDURE — 73030 X-RAY EXAM OF SHOULDER: CPT | Mod: RT

## 2020-11-10 PROCEDURE — 99214 OFFICE O/P EST MOD 30 MIN: CPT

## 2020-11-10 NOTE — DISCUSSION/SUMMARY
[de-identified] : Right shoulder weakness for greater than one year after a fall. She has weakness in rotator cuff testing. She is normal radiographs. She tried rest medications and exercises without relief. We will obtain an MRI to rule out a rotator cuff tear she will follow up after MRI. All questions answered

## 2020-11-10 NOTE — HISTORY OF PRESENT ILLNESS
[de-identified] : 60-year-old right-hand-dominant female with right shoulder pain for greater than one year. She had a fall one year ago with an abduction external rotation moment to her right arm she is in pain and weakness since that time she is pain with lifting her arm she denies any numbness and tingling. She tried to impress home exercises and anti-inflammatory medications without relief\par \par The patient's past medical history, past surgical history, medications, allergies, and social history were reviewed by me today with the patient and documented accordingly. In addition, the patient's family history, which is noncontributory to this visit, was also reviewed.

## 2020-11-10 NOTE — PHYSICAL EXAM
[de-identified] : General Exam\par \par Well developed, well nourished\par No apparent distress\par Oriented to person, place, and time\par Mood: Normal\par Affect: Normal\par Balance and coordination: Normal\par Gait: Normal\par \par Right shoulder exam\par \par Inspection: No swelling, ecchymosis or gross deformity.\par Skin: No masses, No lesions\par Tenderness: No bicipital tenderness, no tenderness to the greater tuberosity/RTC insertion, no anterior shoulder/lesser tuberosity tenderness. No tenderness SC joint, clavicle, AC joint.\par ROM: 160/60/T6\par Impingement tests: Positive Velez\par AC Joint: no pain with cross arm testing\par Biceps: Negative speed\par Strength: 5-/5 abduction, 5/5 external rotation, and internal rotation\par Neuro: AIN, PIN, Ulnar nerve motor intact\par Sensation: Intact to light touch in radial, median, ulnar, and axillary nerve distributions\par Vasc: 2+ radial pulse\par  [de-identified] : \par The following radiographs were ordered and read by me during this patients visit. I reviewed each radiograph in detail with the patient and discussed the findings as highlighted below. \par \par 3 views right shoulder] were obtained today that show no fracture, dislocation. There is no degenerative change seen. There is no malalignment. No obvious osseous abnormality. Otherwise unremarkable.

## 2020-11-20 ENCOUNTER — APPOINTMENT (OUTPATIENT)
Dept: RADIOLOGY | Facility: CLINIC | Age: 60
End: 2020-11-20
Payer: COMMERCIAL

## 2020-11-20 ENCOUNTER — OUTPATIENT (OUTPATIENT)
Dept: OUTPATIENT SERVICES | Facility: HOSPITAL | Age: 60
LOS: 1 days | End: 2020-11-20
Payer: COMMERCIAL

## 2020-11-20 ENCOUNTER — APPOINTMENT (OUTPATIENT)
Dept: MRI IMAGING | Facility: CLINIC | Age: 60
End: 2020-11-20
Payer: COMMERCIAL

## 2020-11-20 DIAGNOSIS — M75.41 IMPINGEMENT SYNDROME OF RIGHT SHOULDER: ICD-10-CM

## 2020-11-20 DIAGNOSIS — Z00.8 ENCOUNTER FOR OTHER GENERAL EXAMINATION: ICD-10-CM

## 2020-11-20 PROCEDURE — 73221 MRI JOINT UPR EXTREM W/O DYE: CPT

## 2020-11-20 PROCEDURE — 73221 MRI JOINT UPR EXTREM W/O DYE: CPT | Mod: 26,RT

## 2020-11-23 ENCOUNTER — NON-APPOINTMENT (OUTPATIENT)
Age: 60
End: 2020-11-23

## 2021-01-11 ENCOUNTER — NON-APPOINTMENT (OUTPATIENT)
Age: 61
End: 2021-01-11

## 2021-01-11 ENCOUNTER — LABORATORY RESULT (OUTPATIENT)
Age: 61
End: 2021-01-11

## 2021-01-11 ENCOUNTER — APPOINTMENT (OUTPATIENT)
Dept: PULMONOLOGY | Facility: CLINIC | Age: 61
End: 2021-01-11
Payer: COMMERCIAL

## 2021-01-11 VITALS — WEIGHT: 167 LBS | BODY MASS INDEX: 28.51 KG/M2 | HEIGHT: 64 IN

## 2021-01-11 VITALS
BODY MASS INDEX: 28.34 KG/M2 | OXYGEN SATURATION: 95 % | HEIGHT: 64 IN | WEIGHT: 166 LBS | HEART RATE: 92 BPM | DIASTOLIC BLOOD PRESSURE: 82 MMHG | SYSTOLIC BLOOD PRESSURE: 127 MMHG | TEMPERATURE: 98.2 F

## 2021-01-11 LAB
ALBUMIN: 10
BILIRUB UR QL STRIP: NEGATIVE
CLARITY UR: CLEAR
COLLECTION METHOD: NORMAL
CREATININE: 50
GLUCOSE UR-MCNC: NEGATIVE
HCG UR QL: 0.2 EU/DL
HGB UR QL STRIP.AUTO: NEGATIVE
KETONES UR-MCNC: NEGATIVE
LEUKOCYTE ESTERASE UR QL STRIP: NORMAL
MICROALBUMIN/CREAT UR TEST STR-RTO: NORMAL
NITRITE UR QL STRIP: NEGATIVE
PH UR STRIP: 6
PROT UR STRIP-MCNC: NEGATIVE
SP GR UR STRIP: 1.01

## 2021-01-11 PROCEDURE — 77085 DXA BONE DENSITY AXL VRT FX: CPT

## 2021-01-11 PROCEDURE — 93000 ELECTROCARDIOGRAM COMPLETE: CPT

## 2021-01-11 PROCEDURE — G0009: CPT

## 2021-01-11 PROCEDURE — 90732 PPSV23 VACC 2 YRS+ SUBQ/IM: CPT

## 2021-01-11 PROCEDURE — 99396 PREV VISIT EST AGE 40-64: CPT | Mod: 25

## 2021-01-11 PROCEDURE — 81003 URINALYSIS AUTO W/O SCOPE: CPT | Mod: QW

## 2021-01-11 PROCEDURE — 99072 ADDL SUPL MATRL&STAF TM PHE: CPT

## 2021-01-11 PROCEDURE — 82044 UR ALBUMIN SEMIQUANTITATIVE: CPT | Mod: QW

## 2021-01-12 NOTE — PHYSICAL EXAM
[No Acute Distress] : no acute distress [Supple] : supple [Normal S1, S2] : normal s1, s2 [No Murmurs] : no murmurs [Clear to Auscultation Bilaterally] : clear to auscultation bilaterally [Normal to Percussion] : normal to percussion [No Abnormalities] : no abnormalities [Benign] : benign [No Cyanosis] : no cyanosis [No Edema] : no edema [No Focal Deficits] : no focal deficits [Oriented x3] : oriented x3 [TextBox_80] : Breast examination negative

## 2021-01-12 NOTE — DISCUSSION/SUMMARY
[FreeTextEntry1] : Borderline HTN. OK today\par Occ palpitations has seen cardio\par Osteoporosis stable

## 2021-01-12 NOTE — HISTORY OF PRESENT ILLNESS
[FreeTextEntry1] : Some MS problems\par \par optho Y\par Derm Y\par Colonoscopy 2-3 years.\par Mammo Y\par GYN Y\par \par Saw cardio last summer told fine. \par

## 2021-01-12 NOTE — ASSESSMENT
[FreeTextEntry1] : Calcium/VITamin D/Exercise as treatment for bone loss discussed.\par Labs drawn in office today\par \par \par

## 2021-01-13 LAB
25(OH)D3 SERPL-MCNC: 62.9 NG/ML
ALBUMIN SERPL ELPH-MCNC: 4.6 G/DL
ALP BLD-CCNC: 64 U/L
ALT SERPL-CCNC: 22 U/L
ANION GAP SERPL CALC-SCNC: 14 MMOL/L
AST SERPL-CCNC: 21 U/L
BASOPHILS # BLD AUTO: 0.05 K/UL
BASOPHILS NFR BLD AUTO: 0.7 %
BILIRUB DIRECT SERPL-MCNC: 0.2 MG/DL
BILIRUB INDIRECT SERPL-MCNC: 0.3 MG/DL
BILIRUB SERPL-MCNC: 0.4 MG/DL
BUN SERPL-MCNC: 17 MG/DL
CALCIUM SERPL-MCNC: 9.9 MG/DL
CHLORIDE SERPL-SCNC: 104 MMOL/L
CHOLEST SERPL-MCNC: 243 MG/DL
CO2 SERPL-SCNC: 24 MMOL/L
CREAT SERPL-MCNC: 0.84 MG/DL
EOSINOPHIL # BLD AUTO: 0.25 K/UL
EOSINOPHIL NFR BLD AUTO: 3.5 %
ESTIMATED AVERAGE GLUCOSE: 105 MG/DL
GLUCOSE SERPL-MCNC: 88 MG/DL
HBA1C MFR BLD HPLC: 5.3 %
HCT VFR BLD CALC: 44.8 %
HDLC SERPL-MCNC: 103 MG/DL
HGB BLD-MCNC: 13.8 G/DL
IMM GRANULOCYTES NFR BLD AUTO: 0.1 %
LDLC SERPL CALC-MCNC: 129 MG/DL
LYMPHOCYTES # BLD AUTO: 2.6 K/UL
LYMPHOCYTES NFR BLD AUTO: 36.5 %
MAN DIFF?: NORMAL
MCHC RBC-ENTMCNC: 27.7 PG
MCHC RBC-ENTMCNC: 30.8 GM/DL
MCV RBC AUTO: 90 FL
MONOCYTES # BLD AUTO: 0.66 K/UL
MONOCYTES NFR BLD AUTO: 9.3 %
NEUTROPHILS # BLD AUTO: 3.55 K/UL
NEUTROPHILS NFR BLD AUTO: 49.9 %
NONHDLC SERPL-MCNC: 140 MG/DL
PLATELET # BLD AUTO: 575 K/UL
POTASSIUM SERPL-SCNC: 5.3 MMOL/L
PROT SERPL-MCNC: 6.9 G/DL
RBC # BLD: 4.98 M/UL
RBC # FLD: 14.8 %
SODIUM SERPL-SCNC: 141 MMOL/L
T3 SERPL-MCNC: 112 NG/DL
T3RU NFR SERPL: 0.5 TBI
T4 FREE SERPL-MCNC: 1.3 NG/DL
T4 SERPL-MCNC: 7.9 UG/DL
TRIGL SERPL-MCNC: 58 MG/DL
TSH SERPL-ACNC: 3.56 UIU/ML
WBC # FLD AUTO: 7.12 K/UL

## 2021-03-17 ENCOUNTER — OUTPATIENT (OUTPATIENT)
Dept: OUTPATIENT SERVICES | Facility: HOSPITAL | Age: 61
LOS: 1 days | Discharge: HOME | End: 2021-03-17

## 2021-03-17 DIAGNOSIS — D72.829 ELEVATED WHITE BLOOD CELL COUNT, UNSPECIFIED: ICD-10-CM

## 2021-03-22 ENCOUNTER — APPOINTMENT (OUTPATIENT)
Dept: HEMATOLOGY ONCOLOGY | Facility: CLINIC | Age: 61
End: 2021-03-22
Payer: COMMERCIAL

## 2021-03-22 ENCOUNTER — RESULT REVIEW (OUTPATIENT)
Age: 61
End: 2021-03-22

## 2021-03-22 VITALS
HEIGHT: 64 IN | WEIGHT: 171.52 LBS | SYSTOLIC BLOOD PRESSURE: 142 MMHG | BODY MASS INDEX: 29.28 KG/M2 | RESPIRATION RATE: 14 BRPM | HEART RATE: 58 BPM | TEMPERATURE: 97.4 F | DIASTOLIC BLOOD PRESSURE: 84 MMHG | OXYGEN SATURATION: 99 %

## 2021-03-22 LAB
BASOPHILS # BLD AUTO: 0.08 K/UL — SIGNIFICANT CHANGE UP (ref 0–0.2)
BASOPHILS NFR BLD AUTO: 0.9 % — SIGNIFICANT CHANGE UP (ref 0–2)
EOSINOPHIL # BLD AUTO: 0.3 K/UL — SIGNIFICANT CHANGE UP (ref 0–0.5)
EOSINOPHIL NFR BLD AUTO: 3.3 % — SIGNIFICANT CHANGE UP (ref 0–6)
HCT VFR BLD CALC: 39.1 % — SIGNIFICANT CHANGE UP (ref 34.5–45)
HGB BLD-MCNC: 13.5 G/DL — SIGNIFICANT CHANGE UP (ref 11.5–15.5)
IMM GRANULOCYTES NFR BLD AUTO: 1.9 % — HIGH (ref 0–1.5)
LYMPHOCYTES # BLD AUTO: 3.2 K/UL — SIGNIFICANT CHANGE UP (ref 1–3.3)
LYMPHOCYTES # BLD AUTO: 35.6 % — SIGNIFICANT CHANGE UP (ref 13–44)
MCHC RBC-ENTMCNC: 29 PG — SIGNIFICANT CHANGE UP (ref 27–34)
MCHC RBC-ENTMCNC: 34.5 G/DL — SIGNIFICANT CHANGE UP (ref 32–36)
MCV RBC AUTO: 83.9 FL — SIGNIFICANT CHANGE UP (ref 80–100)
MONOCYTES # BLD AUTO: 0.81 K/UL — SIGNIFICANT CHANGE UP (ref 0–0.9)
MONOCYTES NFR BLD AUTO: 9 % — SIGNIFICANT CHANGE UP (ref 2–14)
NEUTROPHILS # BLD AUTO: 4.44 K/UL — SIGNIFICANT CHANGE UP (ref 1.8–7.4)
NEUTROPHILS NFR BLD AUTO: 49.3 % — SIGNIFICANT CHANGE UP (ref 43–77)
NRBC # BLD: 0 /100 WBCS — SIGNIFICANT CHANGE UP (ref 0–0)
PLATELET # BLD AUTO: 384 K/UL — SIGNIFICANT CHANGE UP (ref 150–400)
RBC # BLD: 4.66 M/UL — SIGNIFICANT CHANGE UP (ref 3.8–5.2)
RBC # FLD: 14.1 % — SIGNIFICANT CHANGE UP (ref 10.3–14.5)
WBC # BLD: 9 K/UL — SIGNIFICANT CHANGE UP (ref 3.8–10.5)
WBC # FLD AUTO: 9 K/UL — SIGNIFICANT CHANGE UP (ref 3.8–10.5)

## 2021-03-22 PROCEDURE — 99214 OFFICE O/P EST MOD 30 MIN: CPT

## 2021-03-22 PROCEDURE — 99072 ADDL SUPL MATRL&STAF TM PHE: CPT

## 2021-03-22 NOTE — ASSESSMENT
[FreeTextEntry1] : 61yo F w/ borderline HTN here for f/u of thrombocytosis\par JAK2, MPL, CALR negative\par Her plts today are 384k today. She reports stopping various digestive enzymes about 2 weeks ago. Will cont to monitor for now\par All questions answered\par RTC 4 mo

## 2021-03-22 NOTE — HISTORY OF PRESENT ILLNESS
[de-identified] : 59yo F w/ borderline HTN here for evaluation of thrombocytosis\par \par Pt reports first being told of this in Dec 2019 - plt count was 504k. She was to have it repeated in a few months but it was delayed secondary to COVID-19 pandemic. She saw her PMD Dr. Ford and labs done on 6/16/20 showed plt count was 510k. It was elevated in 2018 - 460k. \par Pt reports feeling well. Denies any bleeding or bruising. \par She was seeing homeopathic doctor (Dr. Miguel Self [phone]) and using different drops for the last 3 years, last time was around the beginning of this year.  She also uses various supplements. Only medications are Flonase and Premarin cream. \par \par HCM:\par mammo going in July\par pap 2018 -not due this year\par colonoscopy 2018 [de-identified] : JAK2,CALR and MPL negative\par \par Stopped taking digestive enzymes about 2 weeks ago. Reports that she has less abdominal symptoms.\par \par She got first dose of COVID vaccine a few weeks ago, due for second on 4/8

## 2021-06-22 ENCOUNTER — APPOINTMENT (OUTPATIENT)
Dept: OBGYN | Facility: CLINIC | Age: 61
End: 2021-06-22
Payer: COMMERCIAL

## 2021-06-22 VITALS — DIASTOLIC BLOOD PRESSURE: 66 MMHG | SYSTOLIC BLOOD PRESSURE: 138 MMHG | WEIGHT: 166 LBS | BODY MASS INDEX: 28.49 KG/M2

## 2021-06-22 VITALS — TEMPERATURE: 97.5 F

## 2021-06-22 PROCEDURE — 99072 ADDL SUPL MATRL&STAF TM PHE: CPT

## 2021-06-22 PROCEDURE — 99396 PREV VISIT EST AGE 40-64: CPT

## 2021-06-22 NOTE — HISTORY OF PRESENT ILLNESS
[Mammogramdate] : 7/20 [PapSmeardate] : 2018 [BoneDensityDate] : 2018- MANAGED BY [facility] [ColonoscopyDate] : 2018

## 2021-06-23 LAB — HPV HIGH+LOW RISK DNA PNL CVX: NOT DETECTED

## 2021-06-28 LAB — CYTOLOGY CVX/VAG DOC THIN PREP: ABNORMAL

## 2021-07-15 ENCOUNTER — OUTPATIENT (OUTPATIENT)
Dept: OUTPATIENT SERVICES | Facility: HOSPITAL | Age: 61
LOS: 1 days | Discharge: HOME | End: 2021-07-15

## 2021-07-15 DIAGNOSIS — D72.829 ELEVATED WHITE BLOOD CELL COUNT, UNSPECIFIED: ICD-10-CM

## 2021-07-16 ENCOUNTER — OUTPATIENT (OUTPATIENT)
Dept: OUTPATIENT SERVICES | Facility: HOSPITAL | Age: 61
LOS: 1 days | Discharge: HOME | End: 2021-07-16

## 2021-07-16 DIAGNOSIS — D72.829 ELEVATED WHITE BLOOD CELL COUNT, UNSPECIFIED: ICD-10-CM

## 2021-07-19 ENCOUNTER — RESULT REVIEW (OUTPATIENT)
Age: 61
End: 2021-07-19

## 2021-07-19 ENCOUNTER — APPOINTMENT (OUTPATIENT)
Dept: HEMATOLOGY ONCOLOGY | Facility: CLINIC | Age: 61
End: 2021-07-19
Payer: COMMERCIAL

## 2021-07-19 VITALS
HEIGHT: 64 IN | DIASTOLIC BLOOD PRESSURE: 84 MMHG | RESPIRATION RATE: 15 BRPM | BODY MASS INDEX: 28.98 KG/M2 | SYSTOLIC BLOOD PRESSURE: 134 MMHG | HEART RATE: 72 BPM | WEIGHT: 169.75 LBS | TEMPERATURE: 96.8 F | OXYGEN SATURATION: 98 %

## 2021-07-19 LAB
BASOPHILS # BLD AUTO: 0.06 K/UL — SIGNIFICANT CHANGE UP (ref 0–0.2)
BASOPHILS NFR BLD AUTO: 0.7 % — SIGNIFICANT CHANGE UP (ref 0–2)
EOSINOPHIL # BLD AUTO: 0.25 K/UL — SIGNIFICANT CHANGE UP (ref 0–0.5)
EOSINOPHIL NFR BLD AUTO: 2.8 % — SIGNIFICANT CHANGE UP (ref 0–6)
HCT VFR BLD CALC: 40.1 % — SIGNIFICANT CHANGE UP (ref 34.5–45)
HGB BLD-MCNC: 13.5 G/DL — SIGNIFICANT CHANGE UP (ref 11.5–15.5)
IMM GRANULOCYTES NFR BLD AUTO: 0.4 % — SIGNIFICANT CHANGE UP (ref 0–1.5)
LYMPHOCYTES # BLD AUTO: 3.11 K/UL — SIGNIFICANT CHANGE UP (ref 1–3.3)
LYMPHOCYTES # BLD AUTO: 35 % — SIGNIFICANT CHANGE UP (ref 13–44)
MCHC RBC-ENTMCNC: 29.1 PG — SIGNIFICANT CHANGE UP (ref 27–34)
MCHC RBC-ENTMCNC: 33.7 G/DL — SIGNIFICANT CHANGE UP (ref 32–36)
MCV RBC AUTO: 86.4 FL — SIGNIFICANT CHANGE UP (ref 80–100)
MONOCYTES # BLD AUTO: 0.83 K/UL — SIGNIFICANT CHANGE UP (ref 0–0.9)
MONOCYTES NFR BLD AUTO: 9.3 % — SIGNIFICANT CHANGE UP (ref 2–14)
NEUTROPHILS # BLD AUTO: 4.6 K/UL — SIGNIFICANT CHANGE UP (ref 1.8–7.4)
NEUTROPHILS NFR BLD AUTO: 51.8 % — SIGNIFICANT CHANGE UP (ref 43–77)
NRBC # BLD: 0 /100 WBCS — SIGNIFICANT CHANGE UP (ref 0–0)
PLATELET # BLD AUTO: 432 K/UL — HIGH (ref 150–400)
RBC # BLD: 4.64 M/UL — SIGNIFICANT CHANGE UP (ref 3.8–5.2)
RBC # FLD: 14.4 % — SIGNIFICANT CHANGE UP (ref 10.3–14.5)
WBC # BLD: 8.89 K/UL — SIGNIFICANT CHANGE UP (ref 3.8–10.5)
WBC # FLD AUTO: 8.89 K/UL — SIGNIFICANT CHANGE UP (ref 3.8–10.5)

## 2021-07-19 PROCEDURE — 99072 ADDL SUPL MATRL&STAF TM PHE: CPT

## 2021-07-19 PROCEDURE — 99213 OFFICE O/P EST LOW 20 MIN: CPT

## 2021-07-19 NOTE — ASSESSMENT
[FreeTextEntry1] : 59yo F w/ borderline HTN here for f/u of thrombocytosis\par JAK2, MPL, CALR negative\par Her plts today are 432k today -reviewed results with patient. Will cont to monitor for now\par Advised her to f/u w/ GI \par All questions answered\par RTC 6 mo

## 2021-07-19 NOTE — HISTORY OF PRESENT ILLNESS
[de-identified] : 61yo F w/ borderline HTN here for evaluation of thrombocytosis\par \par Pt reports first being told of this in Dec 2019 - plt count was 504k. She was to have it repeated in a few months but it was delayed secondary to COVID-19 pandemic. She saw her PMD Dr. Ford and labs done on 6/16/20 showed plt count was 510k. It was elevated in 2018 - 460k. \par Pt reports feeling well. Denies any bleeding or bruising. \par She was seeing homeopathic doctor (Dr. Miguel Self [phone]) and using different drops for the last 3 years, last time was around the beginning of this year.  She also uses various supplements. Only medications are Flonase and Premarin cream. \par \par HCM:\par mammo going in July\par pap 2018 -not due this year\par colonoscopy 2018 [de-identified] : JAK2, CALR and MPL negative\par \par Stopped taking digestive enzymes. Now off gluten and dairy. Reports that she has less abdominal symptoms; however notes last 4 days having worsening discomfort as she tried to eat something w/ gluten\par colonoscopy in 2018\par \par She got both doses of COVID vaccine - second on 4/8 \par \par

## 2021-07-22 ENCOUNTER — RESULT REVIEW (OUTPATIENT)
Age: 61
End: 2021-07-22

## 2021-07-22 ENCOUNTER — APPOINTMENT (OUTPATIENT)
Dept: MAMMOGRAPHY | Facility: CLINIC | Age: 61
End: 2021-07-22
Payer: COMMERCIAL

## 2021-07-22 PROCEDURE — 77067 SCR MAMMO BI INCL CAD: CPT

## 2021-07-22 PROCEDURE — 77063 BREAST TOMOSYNTHESIS BI: CPT

## 2022-01-24 ENCOUNTER — NON-APPOINTMENT (OUTPATIENT)
Age: 62
End: 2022-01-24

## 2022-02-04 ENCOUNTER — NON-APPOINTMENT (OUTPATIENT)
Age: 62
End: 2022-02-04

## 2022-02-04 ENCOUNTER — APPOINTMENT (OUTPATIENT)
Dept: PULMONOLOGY | Facility: CLINIC | Age: 62
End: 2022-02-04
Payer: COMMERCIAL

## 2022-02-04 ENCOUNTER — LABORATORY RESULT (OUTPATIENT)
Age: 62
End: 2022-02-04

## 2022-02-04 VITALS
SYSTOLIC BLOOD PRESSURE: 144 MMHG | DIASTOLIC BLOOD PRESSURE: 87 MMHG | HEIGHT: 64 IN | HEART RATE: 90 BPM | TEMPERATURE: 98.1 F | OXYGEN SATURATION: 98 % | WEIGHT: 169 LBS | BODY MASS INDEX: 28.85 KG/M2

## 2022-02-04 VITALS — DIASTOLIC BLOOD PRESSURE: 80 MMHG | SYSTOLIC BLOOD PRESSURE: 120 MMHG

## 2022-02-04 DIAGNOSIS — M25.511 PAIN IN RIGHT SHOULDER: ICD-10-CM

## 2022-02-04 LAB
ALBUMIN: 10
BILIRUB UR QL STRIP: NORMAL
CLARITY UR: CLEAR
COLLECTION METHOD: NORMAL
CREATININE: 100
GLUCOSE UR-MCNC: NORMAL
HCG UR QL: 0.2 EU/DL
HGB UR QL STRIP.AUTO: NORMAL
KETONES UR-MCNC: NORMAL
LEUKOCYTE ESTERASE UR QL STRIP: NORMAL
MICROALBUMIN/CREAT UR TEST STR-RTO: <30
NITRITE UR QL STRIP: NORMAL
PH UR STRIP: 5.5
PROT UR STRIP-MCNC: NORMAL
SP GR UR STRIP: 1.02

## 2022-02-04 PROCEDURE — 81003 URINALYSIS AUTO W/O SCOPE: CPT | Mod: QW

## 2022-02-04 PROCEDURE — 99396 PREV VISIT EST AGE 40-64: CPT | Mod: 25

## 2022-02-04 PROCEDURE — 82044 UR ALBUMIN SEMIQUANTITATIVE: CPT | Mod: QW

## 2022-02-04 PROCEDURE — 93000 ELECTROCARDIOGRAM COMPLETE: CPT

## 2022-02-04 PROCEDURE — 36415 COLL VENOUS BLD VENIPUNCTURE: CPT

## 2022-02-04 NOTE — ASSESSMENT
[FreeTextEntry1] : Calcium/VITamin D/Exercise as treatment for bone loss discussed.\par Labs drawn in office today\par Due dermatology.\par \par

## 2022-02-04 NOTE — DISCUSSION/SUMMARY
[FreeTextEntry1] : Borderline HTN. OK today\par Occ palpitations has seen cardio\par Osteoporosis stable\par Slight apical murmur.

## 2022-02-04 NOTE — HISTORY OF PRESENT ILLNESS
[TextBox_4] : Colonoscopy 2018\par Mammo Y\par GYN Y\par Optho Y\par Derm March going\par BMD Jan 21\par \par \par saw cardio last summer\par saw hematologist recent monitors platelet  count\par some arthritis  c/o right shoulder, seeing ortho, had PT with minimal help , got steroid shot with some help\par arthritis left knee\par \par got booster and flu shot\par

## 2022-02-04 NOTE — PHYSICAL EXAM
[No Acute Distress] : no acute distress [Normal Oropharynx] : normal oropharynx [Normal Appearance] : normal appearance [No Neck Mass] : no neck mass [Normal Rate/Rhythm] : normal rate/rhythm [Normal S1, S2] : normal s1, s2 [No Resp Distress] : no resp distress [Clear to Auscultation Bilaterally] : clear to auscultation bilaterally [No Abnormalities] : no abnormalities [Benign] : benign [Normal Gait] : normal gait [No Clubbing] : no clubbing [No Cyanosis] : no cyanosis [No Edema] : no edema [FROM] : FROM [Normal Color/ Pigmentation] : normal color/ pigmentation [No Focal Deficits] : no focal deficits [Oriented x3] : oriented x3 [Normal Affect] : normal affect [Murmur ___ / 6] : murmur [unfilled] / 6 [TextBox_80] : Breast examination negative

## 2022-02-09 LAB
25(OH)D3 SERPL-MCNC: 52.8 NG/ML
ALBUMIN SERPL ELPH-MCNC: 4.6 G/DL
ALP BLD-CCNC: 67 U/L
ALT SERPL-CCNC: 28 U/L
AST SERPL-CCNC: 25 U/L
BILIRUB DIRECT SERPL-MCNC: 0.1 MG/DL
BILIRUB INDIRECT SERPL-MCNC: 0.2 MG/DL
BILIRUB SERPL-MCNC: 0.4 MG/DL
CHOLEST SERPL-MCNC: 260 MG/DL
FERRITIN SERPL-MCNC: 236 NG/ML
FOLATE SERPL-MCNC: >20 NG/ML
HCYS SERPL-MCNC: 8.8 UMOL/L
HDLC SERPL-MCNC: 100 MG/DL
IRON SATN MFR SERPL: 31 %
IRON SERPL-MCNC: 102 UG/DL
LDLC SERPL CALC-MCNC: 149 MG/DL
NONHDLC SERPL-MCNC: 160 MG/DL
PROT SERPL-MCNC: 7.1 G/DL
T3 SERPL-MCNC: 116 NG/DL
T3RU NFR SERPL: 0.6 TBI
T4 FREE SERPL-MCNC: 1.1 NG/DL
T4 SERPL-MCNC: 7.6 UG/DL
TIBC SERPL-MCNC: 330 UG/DL
TRIGL SERPL-MCNC: 56 MG/DL
TSH SERPL-ACNC: 3.26 UIU/ML
UIBC SERPL-MCNC: 227 UG/DL
VIT B12 SERPL-MCNC: 1575 PG/ML

## 2022-02-15 LAB — VIT B6 SERPL-MCNC: 80.9 UG/L

## 2022-03-05 ENCOUNTER — TRANSCRIPTION ENCOUNTER (OUTPATIENT)
Age: 62
End: 2022-03-05

## 2022-05-06 ENCOUNTER — APPOINTMENT (OUTPATIENT)
Dept: PULMONOLOGY | Facility: CLINIC | Age: 62
End: 2022-05-06

## 2022-05-11 ENCOUNTER — NON-APPOINTMENT (OUTPATIENT)
Age: 62
End: 2022-05-11

## 2022-05-11 ENCOUNTER — APPOINTMENT (OUTPATIENT)
Dept: CARDIOLOGY | Facility: CLINIC | Age: 62
End: 2022-05-11
Payer: COMMERCIAL

## 2022-05-11 VITALS
BODY MASS INDEX: 28.84 KG/M2 | HEART RATE: 79 BPM | SYSTOLIC BLOOD PRESSURE: 143 MMHG | DIASTOLIC BLOOD PRESSURE: 84 MMHG | OXYGEN SATURATION: 100 % | WEIGHT: 168 LBS

## 2022-05-11 PROCEDURE — 99214 OFFICE O/P EST MOD 30 MIN: CPT

## 2022-05-11 PROCEDURE — 93000 ELECTROCARDIOGRAM COMPLETE: CPT

## 2022-05-11 NOTE — REASON FOR VISIT
[Symptom and Test Evaluation] : symptom and test evaluation [FreeTextEntry1] : Still having palpitations. Not disruptive, overall tolerable. Exercising regularly. No exertional sx. \par She's concerned about the need for statins given her . \par \par 1. I suspect taking Crestor is appropriate for her, but we will arrange coronary calcium score for more guidance. If score is above 0, start Crestor. If it's 0, we will discuss options (I still favor starting Crestor). \par \par 2. Elevated BP. Gave patient protocol to follow, will bring in log next visit.

## 2022-05-11 NOTE — ASSESSMENT
[FreeTextEntry1] : 1. I suspect taking Crestor is appropriate for her, but we will arrange coronary calcium score for more guidance. If score is above 0, start Crestor. If it's 0, we will discuss options (I still favor starting Crestor). \par \par 2. Elevated BP. Gave patient protocol to follow, will bring in log next visit.

## 2022-05-18 ENCOUNTER — APPOINTMENT (OUTPATIENT)
Dept: CT IMAGING | Facility: CLINIC | Age: 62
End: 2022-05-18
Payer: SELF-PAY

## 2022-05-18 ENCOUNTER — OUTPATIENT (OUTPATIENT)
Dept: OUTPATIENT SERVICES | Facility: HOSPITAL | Age: 62
LOS: 1 days | End: 2022-05-18
Payer: SELF-PAY

## 2022-05-18 DIAGNOSIS — R03.0 ELEVATED BLOOD-PRESSURE READING, WITHOUT DIAGNOSIS OF HYPERTENSION: ICD-10-CM

## 2022-05-18 DIAGNOSIS — Z00.8 ENCOUNTER FOR OTHER GENERAL EXAMINATION: ICD-10-CM

## 2022-05-18 PROCEDURE — 75571 CT HRT W/O DYE W/CA TEST: CPT

## 2022-05-18 PROCEDURE — 75571 CT HRT W/O DYE W/CA TEST: CPT | Mod: 26

## 2022-05-24 ENCOUNTER — APPOINTMENT (OUTPATIENT)
Dept: CV DIAGNOSITCS | Facility: HOSPITAL | Age: 62
End: 2022-05-24

## 2022-05-24 ENCOUNTER — APPOINTMENT (OUTPATIENT)
Dept: PULMONOLOGY | Facility: CLINIC | Age: 62
End: 2022-05-24
Payer: COMMERCIAL

## 2022-05-24 ENCOUNTER — OUTPATIENT (OUTPATIENT)
Dept: OUTPATIENT SERVICES | Facility: HOSPITAL | Age: 62
LOS: 1 days | End: 2022-05-24
Payer: COMMERCIAL

## 2022-05-24 VITALS
OXYGEN SATURATION: 93 % | RESPIRATION RATE: 16 BRPM | TEMPERATURE: 95.9 F | SYSTOLIC BLOOD PRESSURE: 135 MMHG | DIASTOLIC BLOOD PRESSURE: 80 MMHG | HEART RATE: 81 BPM

## 2022-05-24 DIAGNOSIS — R91.1 SOLITARY PULMONARY NODULE: ICD-10-CM

## 2022-05-24 DIAGNOSIS — R03.0 ELEVATED BLOOD-PRESSURE READING, WITHOUT DIAGNOSIS OF HYPERTENSION: ICD-10-CM

## 2022-05-24 DIAGNOSIS — Z87.898 PERSONAL HISTORY OF OTHER SPECIFIED CONDITIONS: ICD-10-CM

## 2022-05-24 PROCEDURE — 93306 TTE W/DOPPLER COMPLETE: CPT | Mod: 26

## 2022-05-24 PROCEDURE — 94010 BREATHING CAPACITY TEST: CPT

## 2022-05-24 PROCEDURE — 99213 OFFICE O/P EST LOW 20 MIN: CPT | Mod: 25

## 2022-05-24 PROCEDURE — 71046 X-RAY EXAM CHEST 2 VIEWS: CPT

## 2022-05-24 NOTE — ASSESSMENT
[FreeTextEntry1] : Observation.\par No indication for follow-up CT.\par Follow-up at regular appointment.\par \par

## 2022-05-24 NOTE — PROCEDURE
[FreeTextEntry1] : 05/24/2022\par Pulmonary function testing\par FEV1, FVC, and FEV1/FVC are within normal limits. FEV1, FVC, and FEV1/FVC are within normal limits. \par \par \par Patient ID:[id number]  YOB: 1960 (61Y 11M)  Gender:F\par \par \par \par \par \par \par \par \par \par \par \par \par \par \par \par \par \par \par \par \par \par \par \par \par \par \par \par \par \par \par \par \par \par \par \par \par \par \par \par \par \par \par \par \par   \par  \par \par   \par  \par \par   \par  \par \par \par \par \par \par \par \par \par \par \par \par \par \par \par \par  \par \par  \par \par  \par \par  \par \par \par  \par \par  \par \par  \par \par  \par \par \par \par \par \par \par \par \par \par \par \par \par \par \par \par \par \par \par \par \par \par \par \par \par \par \par \par \par \par \par \par \par \par \par \par \par \par \par Exam has a report\par \par \par \par \par \par \par \par \par \par \par \par \par \par \par \par \par \par \par \par \par \par \par \par \par \par \par \par Exam has a report\par \par \par \par \par \par \par \par \par \par \par \par \par \par \par \par \par \par \par \par \par \par \par \par \par \par \par \par Exam has a report\par \par \par \par \par \par \par \par \par \par \par \par \par \par \par \par \par \par \par \par \par \par \par \par \par \par \par \par Exam has only an order. Images are not available.\par \par \par \par \par \par \par \par \par \par \par \par \par \par \par \par \par \par \par \par \par \par \par \par \par \par \par \par Exam has images only\par \par \par \par \par \par \par \par \par \par \par \par \par \par \par \par \par \par \par \par \par \par \par \par \par \par \par \par Exam has a report\par \par \par \par \par \par \par \par \par \par \par \par \par \par \par \par \par \par \par \par \par \par \par \par \par \par \par \par Exam has images only\par \par \par \par \par \par \par \par \par \par \par \par \par \par \par \par \par \par \par \par \par \par \par \par \par \par \par \par Exam has a report\par \par \par \par \par \par \par \par \par \par \par \par \par \par \par \par \par \par \par \par \par \par \par \par \par \par \par \par Exam has a report\par \par \par \par \par \par \par \par \par \par \par \par \par \par \par \par \par \par \par \par \par \par \par \par \par \par \par \par Exam has images only\par \par \par \par \par \par \par \par \par \par \par \par \par \par \par \par \par \par \par \par \par \par \par \par \par \par \par \par Exam has a report\par \par \par \par \par \par \par \par \par \par \par \par \par \par \par \par \par \par \par \par \par \par \par \par \par \par \par \par Exam has a report\par \par \par \par \par \par \par \par \par \par \par \par \par \par \par \par \par \par \par \par \par \par \par \par \par \par \par \par Exam has images only\par \par \par \par \par \par \par \par \par \par \par \par \par \par \par \par \par \par \par \par \par \par \par \par \par \par \par \par Exam has images only\par \par \par \par \par \par \par \par \par \par \par \par \par \par \par \par \par \par \par \par \par \par \par \par \par \par \par \par Exam has a report\par \par \par \par \par \par \par \par \par \par \par \par \par \par \par \par \par \par \par \par \par \par \par \par \par \par \par \par Exam has a report\par \par \par \par \par \par \par \par \par \par \par \par \par \par \par \par \par \par \par \par \par \par \par \par \par \par \par \par Exam has a report\par \par \par \par \par \par \par \par \par \par \par \par \par \par \par \par \par \par \par \par \par \par \par \par \par \par \par \par Exam has a report\par \par \par \par \par \par \par \par \par \par \par \par \par \par \par \par \par \par \par \par \par \par \par \par \par \par \par \par Exam has images only\par \par \par \par \par \par \par \par \par \par \par \par \par \par \par \par \par \par \par \par \par \par \par \par \par \par \par \par Exam has a report\par \par \par \par \par \par \par \par \par \par \par \par \par \par \par \par \par \par \par \par \par \par \par \par \par \par \par \par Exam has images only\par \par \par \par \par \par \par \par \par \par \par \par \par \par \par \par \par \par \par \par \par \par \par \par \par \par \par \par Exam has a report\par \par \par \par \par \par \par \par \par \par \par \par \par \par \par \par \par \par \par \par \par \par \par \par \par \par \par \par Exam has images only\par \par \par \par \par \par \par \par \par \par \par \par \par \par \par \par \par \par \par \par \par \par \par \par \par \par \par \par Exam has a report\par \par \par \par \par \par \par \par \par \par \par \par \par \par \par \par \par \par \par \par \par \par \par \par \par \par \par \par Exam has images only\par \par \par \par \par \par \par \par \par \par \par \par \par \par \par \par \par \par \par \par \par \par \par \par \par \par \par \par Exam has a report\par \par \par \par \par \par \par \par \par \par \par \par \par \par \par \par \par \par \par \par \par \par \par \par \par \par \par \par Exam has images only\par \par \par \par \par \par \par \par \par \par \par \par \par \par \par \par \par \par \par \par \par \par \par \par \par \par \par \par Exam has a report\par \par \par \par \par \par \par \par \par \par \par \par \par \par \par \par \par \par \par \par \par \par \par \par \par \par \par \par Exam has images only\par \par \par \par \par \par \par \par \par \par \par \par \par \par \par \par \par \par \par \par \par \par \par \par \par \par \par \par Exam has a report\par \par \par \par \par \par \par \par \par \par \par \par \par \par \par \par \par \par \par \par \par \par \par \par \par \par \par \par Exam has images only\par \par \par \par \par \par \par \par \par \par \par \par \par \par \par \par \par \par \par \par \par \par \par \par \par \par \par \par Exam has a report\par \par \par \par \par \par \par \par \par \par \par \par \par \par \par \par \par \par \par \par \par \par \par \par \par \par \par \par Exam has images only\par \par \par \par \par \par \par \par \par \par \par \par \par \par \par \par \par \par \par \par \par \par \par \par \par \par \par \par Exam has a report\par \par \par \par \par \par \par \par \par \par \par \par \par \par \par \par \par \par \par \par \par \par \par \par \par \par \par \par Exam has images only\par \par \par \par \par \par \par \par \par \par \par \par \par \par \par \par \par \par \par \par \par \par \par \par \par \par \par \par Exam has a report\par \par \par \par \par \par \par \par \par \par \par \par \par \par \par \par \par \par \par \par \par \par \par \par \par \par \par \par Exam has images only\par \par \par \par \par \par \par \par \par \par \par \par \par \par \par \par \par \par \par \par \par \par \par \par \par \par \par \par Exam has a report\par \par \par \par \par \par \par \par \par \par \par \par \par \par \par \par \par \par \par \par \par \par \par \par \par \par \par \par Exam has images only\par \par \par \par \par \par \par \par \par \par \par \par \par \par \par \par \par \par \par \par \par \par \par \par \par \par \par \par Exam has a report\par \par \par \par \par \par \par \par \par \par \par \par \par \par Exam has images only\par \par \par \par \par \par \par \par \par \par \par \par \par \par \par \par \par \par \par \par \par \par \par \par \par \par \par \par Exam has images only\par \par \par \par \par \par \par  \par  \par \par \par Patient Reports  \par   \par  \par    \par  \par \par \par \par \par  1 / 1 Bettie, Roger   \par  \par \par \par \par Report date: 5/19/2022 \par  \par  View Order\par \par \par (Report matches study selected on Patient History pane)\par \par \par   \par \par \par \par EXAM: [id number] - CT HEART CALCIUM SCORE - ORDERED BY: DORON DAVIS\par \par \par PROCEDURE DATE: 05/18/2022\par \par \par \par INTERPRETATION: Indication: 61-year-old woman. Borderline hypertension. Suspected hyperlipidemia.\par \par Procedure: Noncontrast ECG-gated computed tomography of the heart was performed.\par \par FINDINGS:\par \par Total calcium score is 0 Agatston Units. There is a tiny calcified plaque of the proximal LCx, which does not reach threshold for detection by the Agatston method.\par \par Non-Coronary:\par \par Heart size normal. No pericardial effusion. Mild calcifications at the aortic root and arch.\par \par Nonspecific left lower lobe 0.3 cm nodule (image 99, series 4). Imaged abdomen, soft tissues, and osseous structures unremarkable.\par \par IMPRESSION:\par \par Total calcium score is 0 Agatston Units.\par \par Nonspecific left lower lobe 0.3 cm nodule.\par \par --- End of Report ---

## 2022-05-24 NOTE — HISTORY OF PRESENT ILLNESS
[Never] : never [TextBox_4] : Saw cardiology. Had calcium score and told lung nodule\par Done [facility]. \par No significant cough, wheezing, chest pain.\par Occasional exertional dyspnea but recently good exercise tolerance.\par \par No family history lung carcinoma. \par \par \par

## 2022-05-24 NOTE — PHYSICAL EXAM
[No Acute Distress] : no acute distress [Normal Oropharynx] : normal oropharynx [Normal Appearance] : normal appearance [No Neck Mass] : no neck mass [Normal Rate/Rhythm] : normal rate/rhythm [Murmur ___ / 6] : murmur [unfilled] / 6 [Normal S1, S2] : normal s1, s2 [No Resp Distress] : no resp distress [Clear to Auscultation Bilaterally] : clear to auscultation bilaterally [No Abnormalities] : no abnormalities [Benign] : benign [Normal Gait] : normal gait [No Clubbing] : no clubbing [No Cyanosis] : no cyanosis [No Edema] : no edema [FROM] : FROM [Normal Color/ Pigmentation] : normal color/ pigmentation [No Focal Deficits] : no focal deficits [Oriented x3] : oriented x3 [Normal Affect] : normal affect [TextBox_80] : Breast examination negative

## 2022-05-24 NOTE — DISCUSSION/SUMMARY
[FreeTextEntry1] : Solitary 3 mm pulmonary nodule.  Low risk patient.  No indication for follow-up CT.  Based on current guidelines.\par Borderline HTN. OK today\par Occ palpitations has seen cardio\par Osteoporosis stable\par Slight apical murmur.

## 2022-06-28 ENCOUNTER — APPOINTMENT (OUTPATIENT)
Dept: OBGYN | Facility: CLINIC | Age: 62
End: 2022-06-28

## 2022-06-28 VITALS
HEIGHT: 64 IN | BODY MASS INDEX: 29.19 KG/M2 | DIASTOLIC BLOOD PRESSURE: 90 MMHG | SYSTOLIC BLOOD PRESSURE: 135 MMHG | WEIGHT: 171 LBS

## 2022-06-28 DIAGNOSIS — M81.0 AGE-RELATED OSTEOPOROSIS W/OUT CURRENT PATHOLOGICAL FRACTURE: ICD-10-CM

## 2022-06-28 PROCEDURE — 99396 PREV VISIT EST AGE 40-64: CPT

## 2022-06-28 RX ORDER — ROSUVASTATIN CALCIUM 5 MG/1
5 TABLET, FILM COATED ORAL
Qty: 90 | Refills: 3 | Status: DISCONTINUED | COMMUNITY
Start: 2022-02-09 | End: 2022-06-28

## 2022-06-28 RX ORDER — ESTRADIOL 0.1 MG/G
0.1 CREAM VAGINAL
Qty: 1 | Refills: 0 | Status: DISCONTINUED | COMMUNITY
Start: 2021-06-24 | End: 2022-06-28

## 2022-06-28 RX ORDER — CONJUGATED ESTROGENS 0.62 MG/G
0.62 CREAM VAGINAL
Qty: 1 | Refills: 4 | Status: DISCONTINUED | COMMUNITY
Start: 2019-05-29 | End: 2022-06-28

## 2022-07-15 ENCOUNTER — OUTPATIENT (OUTPATIENT)
Dept: OUTPATIENT SERVICES | Facility: HOSPITAL | Age: 62
LOS: 1 days | Discharge: HOME | End: 2022-07-15

## 2022-07-15 DIAGNOSIS — D72.829 ELEVATED WHITE BLOOD CELL COUNT, UNSPECIFIED: ICD-10-CM

## 2022-07-20 NOTE — DISCUSSION/SUMMARY
[de-identified] : Discussed at length the nature of the patient’s condition. Their left knee symptoms are improving but still persist. She may have a lateral meniscal tear. 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 Suggested we obtain an MRI of the left knee to evaluate interarticular pathology. When results are available, we will discuss further. In the interim, I recommend she continue with PT following [facility] protocol, home exercise, and Advil or Aleve prn. They can continue activities as tolerated.  Azithromycin Pregnancy And Lactation Text: This medication is considered safe during pregnancy and is also secreted in breast milk.

## 2022-07-22 ENCOUNTER — RESULT REVIEW (OUTPATIENT)
Age: 62
End: 2022-07-22

## 2022-07-22 ENCOUNTER — APPOINTMENT (OUTPATIENT)
Dept: MAMMOGRAPHY | Facility: CLINIC | Age: 62
End: 2022-07-22

## 2022-07-22 PROCEDURE — 77063 BREAST TOMOSYNTHESIS BI: CPT

## 2022-07-22 PROCEDURE — 77067 SCR MAMMO BI INCL CAD: CPT

## 2022-07-26 ENCOUNTER — NON-APPOINTMENT (OUTPATIENT)
Age: 62
End: 2022-07-26

## 2022-07-27 ENCOUNTER — RESULT REVIEW (OUTPATIENT)
Age: 62
End: 2022-07-27

## 2022-07-27 ENCOUNTER — APPOINTMENT (OUTPATIENT)
Dept: HEMATOLOGY ONCOLOGY | Facility: CLINIC | Age: 62
End: 2022-07-27

## 2022-07-27 VITALS
WEIGHT: 168.98 LBS | TEMPERATURE: 97.7 F | HEART RATE: 79 BPM | SYSTOLIC BLOOD PRESSURE: 139 MMHG | RESPIRATION RATE: 16 BRPM | OXYGEN SATURATION: 99 % | DIASTOLIC BLOOD PRESSURE: 85 MMHG | BODY MASS INDEX: 29.01 KG/M2

## 2022-07-27 LAB
BASOPHILS # BLD AUTO: 0.06 K/UL — SIGNIFICANT CHANGE UP (ref 0–0.2)
BASOPHILS NFR BLD AUTO: 0.7 % — SIGNIFICANT CHANGE UP (ref 0–2)
EOSINOPHIL # BLD AUTO: 0.27 K/UL — SIGNIFICANT CHANGE UP (ref 0–0.5)
EOSINOPHIL NFR BLD AUTO: 3 % — SIGNIFICANT CHANGE UP (ref 0–6)
HCT VFR BLD CALC: 39.5 % — SIGNIFICANT CHANGE UP (ref 34.5–45)
HGB BLD-MCNC: 13.3 G/DL — SIGNIFICANT CHANGE UP (ref 11.5–15.5)
IMM GRANULOCYTES NFR BLD AUTO: 0.7 % — SIGNIFICANT CHANGE UP (ref 0–1.5)
LYMPHOCYTES # BLD AUTO: 3.36 K/UL — HIGH (ref 1–3.3)
LYMPHOCYTES # BLD AUTO: 37.8 % — SIGNIFICANT CHANGE UP (ref 13–44)
MCHC RBC-ENTMCNC: 28.8 PG — SIGNIFICANT CHANGE UP (ref 27–34)
MCHC RBC-ENTMCNC: 33.7 G/DL — SIGNIFICANT CHANGE UP (ref 32–36)
MCV RBC AUTO: 85.5 FL — SIGNIFICANT CHANGE UP (ref 80–100)
MONOCYTES # BLD AUTO: 0.9 K/UL — SIGNIFICANT CHANGE UP (ref 0–0.9)
MONOCYTES NFR BLD AUTO: 10.1 % — SIGNIFICANT CHANGE UP (ref 2–14)
NEUTROPHILS # BLD AUTO: 4.23 K/UL — SIGNIFICANT CHANGE UP (ref 1.8–7.4)
NEUTROPHILS NFR BLD AUTO: 47.7 % — SIGNIFICANT CHANGE UP (ref 43–77)
NRBC # BLD: 0 /100 WBCS — SIGNIFICANT CHANGE UP (ref 0–0)
PLATELET # BLD AUTO: 549 K/UL — HIGH (ref 150–400)
RBC # BLD: 4.62 M/UL — SIGNIFICANT CHANGE UP (ref 3.8–5.2)
RBC # FLD: 14.6 % — HIGH (ref 10.3–14.5)
WBC # BLD: 8.88 K/UL — SIGNIFICANT CHANGE UP (ref 3.8–10.5)
WBC # FLD AUTO: 8.88 K/UL — SIGNIFICANT CHANGE UP (ref 3.8–10.5)

## 2022-07-27 PROCEDURE — 99213 OFFICE O/P EST LOW 20 MIN: CPT

## 2022-07-27 NOTE — ASSESSMENT
[FreeTextEntry1] : 63yo F w/ borderline HTN here for f/u of thrombocytosis\par JAK2, MPL, CALR negative\par Her plts today are 549k today -reviewed results with patient. Will cont to monitor for now\par All questions answered\par RTC 1 yr, sees PMD in between for labs

## 2022-07-27 NOTE — HISTORY OF PRESENT ILLNESS
[de-identified] : 61yo F w/ borderline HTN here for evaluation of thrombocytosis\par \par Pt reports first being told of this in Dec 2019 - plt count was 504k. She was to have it repeated in a few months but it was delayed secondary to COVID-19 pandemic. She saw her PMD Dr. Ford and labs done on 6/16/20 showed plt count was 510k. It was elevated in 2018 - 460k. \par Pt reports feeling well. Denies any bleeding or bruising. \par She was seeing homeopathic doctor (Dr. Miguel Self [phone]) and using different drops for the last 3 years, last time was around the beginning of this year.  She also uses various supplements. Only medications are Flonase and Premarin cream. \par \par HCM:\par mammo going in July\par pap 2018 -not due this year\par colonoscopy 2018 [de-identified] : JAK2, CALR and MPL negative\par \par Stopped taking digestive enzymes. Now off gluten and dairy. Reports that she is having abdominal symptoms again recently. \par colonoscopy in 2018\par \par She got both doses of COVID vaccine - second on 4/8 \par \par

## 2022-08-17 ENCOUNTER — APPOINTMENT (OUTPATIENT)
Dept: CARDIOLOGY | Facility: CLINIC | Age: 62
End: 2022-08-17

## 2022-08-17 VITALS
SYSTOLIC BLOOD PRESSURE: 148 MMHG | DIASTOLIC BLOOD PRESSURE: 88 MMHG | BODY MASS INDEX: 28.85 KG/M2 | HEART RATE: 67 BPM | OXYGEN SATURATION: 99 % | WEIGHT: 169 LBS | HEIGHT: 64 IN

## 2022-08-17 PROCEDURE — 99214 OFFICE O/P EST MOD 30 MIN: CPT

## 2022-08-17 PROCEDURE — 93000 ELECTROCARDIOGRAM COMPLETE: CPT

## 2022-08-17 NOTE — ASSESSMENT
[FreeTextEntry1] : 1. HTN. Blood pressure remains elevated, even upon reevaluation. The patient will keep a log of blood pressures at home, per protocol. We will review the log during the next visit. No escalation of BP meds for now.\par \par 2. HLD. 3 month trial exercise and nutrition, recheck labs in 3 m.\par \par 3. We have reviewed current AHA exercise guidelines, including 40 minutes 4 days per week of moderate level aerobic activity and 20 minutes twice per week of strength training.\par

## 2022-08-17 NOTE — REASON FOR VISIT
[Symptom and Test Evaluation] : symptom and test evaluation [Hyperlipidemia] : hyperlipidemia [Hypertension] : hypertension [FreeTextEntry1] : Pt kept a close log of BP at home. I reviewed it. Her SBP has been well controlled, but many DBP readings were 80-84 mm Hg. \par Generally doing well, no CP or dyspnea.\par \par 1. HTN. Blood pressure remains elevated, even upon reevaluation. The patient will keep a log of blood pressures at home, per protocol. We will review the log during the next visit. No escalation of BP meds for now.\par \par 2. HLD. 3 month trial exercise and nutrition, recheck labs in 3 m.\par \par 3. We have reviewed current AHA exercise guidelines, including 40 minutes 4 days per week of moderate level aerobic activity and 20 minutes twice per week of strength training.\par

## 2022-11-02 ENCOUNTER — APPOINTMENT (OUTPATIENT)
Dept: ORTHOPEDIC SURGERY | Facility: CLINIC | Age: 62
End: 2022-11-02

## 2022-11-02 VITALS — BODY MASS INDEX: 28.85 KG/M2 | HEIGHT: 64 IN | WEIGHT: 169 LBS

## 2022-11-02 PROCEDURE — 72070 X-RAY EXAM THORAC SPINE 2VWS: CPT

## 2022-11-02 PROCEDURE — 72100 X-RAY EXAM L-S SPINE 2/3 VWS: CPT

## 2022-11-02 PROCEDURE — 99204 OFFICE O/P NEW MOD 45 MIN: CPT

## 2022-11-02 NOTE — HISTORY OF PRESENT ILLNESS
[7] : 7 [8] : 8 [Diffuse] : diffuse [Sharp] : sharp [de-identified] : 11-2-22- 1+ week history of right sided /and l spine pain. denies injury. denies radicular complaints. pain worse with sitting and does wake her up at night. she tried advil with some help\par \par denies contributory pmh \par is retired [FreeTextEntry5] : pt c.o pain in rt mid back during sitting about a week \par pt states no specific injury \par

## 2022-11-02 NOTE — PHYSICAL EXAM
[Flexion] : flexion [Extension] : extension [Bending to left] : bending to left [Bending to right] : bending to right [] : negative equivocal straight leg raise

## 2022-11-07 ENCOUNTER — NON-APPOINTMENT (OUTPATIENT)
Age: 62
End: 2022-11-07

## 2022-11-08 ENCOUNTER — APPOINTMENT (OUTPATIENT)
Dept: ORTHOPEDIC SURGERY | Facility: CLINIC | Age: 62
End: 2022-11-08

## 2022-11-08 PROCEDURE — 99204 OFFICE O/P NEW MOD 45 MIN: CPT

## 2022-11-08 NOTE — ASSESSMENT
[FreeTextEntry1] : C/w muscle relaxant\par Muscle Relaxants- To help decrease muscle spasm and assist with pain relief. Advised of sedating effects and instructed not to drive, operate heavy machinery, or take with other sedating medications. \par \par

## 2022-11-08 NOTE — IMAGING
[de-identified] : Examination of the spine is as follows: \par Palpation (Right): lumbar paraspinal spasm, lumbar paraspinal tenderness, thoracic paraspinal spasm, thoracic paraspinal tenderness. Stiffness at extremes of: flexion, extension, bending to left, bending to right. \par Testing (Bilateral): negative straight leg raise, negative sitting straight leg raise, negative equivocal straight leg raise. \par Neuro: light touch intact throughout both lower extremities.  [Facet arthropathy] : Facet arthropathy [Disc space narrowing] : Disc space narrowing

## 2022-11-08 NOTE — HISTORY OF PRESENT ILLNESS
[Mid-back] : mid-back [Lower back] : lower back [4] : 4 [5] : 5 [Diffuse] : diffuse [Sharp] : sharp [de-identified] : Pt seen by non-spine partners in the past \par \par PA Karl note 11-2-22- "1+ week history of right sided /and l spine pain. denies injury. denies radicular complaints. pain worse with sitting and does wake her up at night. she tried advil with some help\par \par denies contributory pmh \par is retired"\par \par 11/8/22-  Was RXed MDP and Robaxin at [facility]. Continued R sided lumbar spasm. MDP with good relief. \par  [FreeTextEntry5] : pt c.o pain in rt mid back during sitting about a week \par pt states no specific injury \par  [de-identified] : ULISSES Rose

## 2022-11-09 ENCOUNTER — APPOINTMENT (OUTPATIENT)
Dept: INTERNAL MEDICINE | Facility: CLINIC | Age: 62
End: 2022-11-09

## 2022-11-09 VITALS
TEMPERATURE: 97.9 F | DIASTOLIC BLOOD PRESSURE: 85 MMHG | SYSTOLIC BLOOD PRESSURE: 141 MMHG | HEIGHT: 64 IN | OXYGEN SATURATION: 98 % | HEART RATE: 83 BPM | BODY MASS INDEX: 28.68 KG/M2 | WEIGHT: 168 LBS

## 2022-11-09 VITALS — SYSTOLIC BLOOD PRESSURE: 134 MMHG | DIASTOLIC BLOOD PRESSURE: 86 MMHG

## 2022-11-09 DIAGNOSIS — M54.9 DORSALGIA, UNSPECIFIED: ICD-10-CM

## 2022-11-09 PROCEDURE — G0444 DEPRESSION SCREEN ANNUAL: CPT | Mod: 59

## 2022-11-09 PROCEDURE — 99203 OFFICE O/P NEW LOW 30 MIN: CPT | Mod: 25

## 2022-11-09 RX ORDER — METHYLPREDNISOLONE 4 MG/1
4 TABLET ORAL
Qty: 1 | Refills: 0 | Status: DISCONTINUED | COMMUNITY
Start: 2022-11-02 | End: 2022-11-09

## 2022-11-09 NOTE — PLAN
[FreeTextEntry1] : -Monitor BP at home, has appt pending w/ cardiology\par Disucssed low sodium intake\par Activity as tolerated\par RTO for full physical

## 2022-11-09 NOTE — HISTORY OF PRESENT ILLNESS
[FreeTextEntry1] : Establish care [de-identified] : 61 y/o f. hx of hld, borderline BP readings here to establish care\par Saw ortho secondary to new onset back pain-diagnosed w/ muscle spasm, on muscle relaxant, notes improvement and now pending PT\par Currently not exercising\par BP borderline today

## 2022-11-09 NOTE — HEALTH RISK ASSESSMENT
[0] : 2) Feeling down, depressed, or hopeless: Not at all (0) [PHQ-2 Negative - No further assessment needed] : PHQ-2 Negative - No further assessment needed [[id number]] : 0

## 2022-11-16 ENCOUNTER — APPOINTMENT (OUTPATIENT)
Dept: CARDIOLOGY | Facility: CLINIC | Age: 62
End: 2022-11-16

## 2022-11-16 ENCOUNTER — NON-APPOINTMENT (OUTPATIENT)
Age: 62
End: 2022-11-16

## 2022-11-16 VITALS
BODY MASS INDEX: 28.68 KG/M2 | HEIGHT: 64 IN | WEIGHT: 168 LBS | HEART RATE: 67 BPM | OXYGEN SATURATION: 100 % | SYSTOLIC BLOOD PRESSURE: 153 MMHG | DIASTOLIC BLOOD PRESSURE: 88 MMHG

## 2022-11-16 PROCEDURE — 93000 ELECTROCARDIOGRAM COMPLETE: CPT

## 2022-11-16 PROCEDURE — 99214 OFFICE O/P EST MOD 30 MIN: CPT

## 2022-11-16 NOTE — REASON FOR VISIT
[Symptom and Test Evaluation] : symptom and test evaluation [Hypertension] : hypertension [FreeTextEntry1] : Kept a good BP log, diastolics generally above 80 mm Hg.\par Already following a good exercise program.\par \par 1. Start amlodipine 2.5mg daily.\par f/u 3 m

## 2023-02-06 ENCOUNTER — APPOINTMENT (OUTPATIENT)
Dept: PULMONOLOGY | Facility: CLINIC | Age: 63
End: 2023-02-06

## 2023-02-07 ENCOUNTER — APPOINTMENT (OUTPATIENT)
Dept: INTERNAL MEDICINE | Facility: CLINIC | Age: 63
End: 2023-02-07
Payer: COMMERCIAL

## 2023-02-07 VITALS
BODY MASS INDEX: 28.85 KG/M2 | TEMPERATURE: 97.9 F | SYSTOLIC BLOOD PRESSURE: 147 MMHG | HEART RATE: 68 BPM | HEIGHT: 64 IN | WEIGHT: 169 LBS | DIASTOLIC BLOOD PRESSURE: 83 MMHG | OXYGEN SATURATION: 100 %

## 2023-02-07 VITALS — DIASTOLIC BLOOD PRESSURE: 80 MMHG | SYSTOLIC BLOOD PRESSURE: 124 MMHG

## 2023-02-07 PROCEDURE — G0444 DEPRESSION SCREEN ANNUAL: CPT | Mod: 59

## 2023-02-07 PROCEDURE — 36415 COLL VENOUS BLD VENIPUNCTURE: CPT

## 2023-02-07 PROCEDURE — 99396 PREV VISIT EST AGE 40-64: CPT | Mod: 25

## 2023-02-07 NOTE — HISTORY OF PRESENT ILLNESS
[FreeTextEntry1] : CPE [de-identified] : Here for CPE\par Last colonoscopy in 2018\par Due for mammogram in Spring\par Has started exercising\par Started norvasc 2.5mg by cardiology

## 2023-02-07 NOTE — HEALTH RISK ASSESSMENT
[0] : 2) Feeling down, depressed, or hopeless: Not at all (0) [PHQ-2 Negative - No further assessment needed] : PHQ-2 Negative - No further assessment needed [No falls in past year] : Patient reported no falls in the past year [Fully functional (bathing, dressing, toileting, transferring, walking, feeding)] : Fully functional (bathing, dressing, toileting, transferring, walking, feeding) [Fully functional (using the telephone, shopping, preparing meals, housekeeping, doing laundry, using] : Fully functional and needs no help or supervision to perform IADLs (using the telephone, shopping, preparing meals, housekeeping, doing laundry, using transportation, managing medications and managing finances) [[id number]] : 0 [Reports changes in hearing] : Reports no changes in hearing [Reports changes in vision] : Reports no changes in vision

## 2023-02-07 NOTE — PHYSICAL EXAM
[No Palpable Aorta] : no palpable aorta [No Extremity Clubbing/Cyanosis] : no extremity clubbing/cyanosis [Soft] : abdomen soft [Non Tender] : non-tender [Non-distended] : non-distended [Normal] : affect was normal and insight and judgment were intact

## 2023-02-08 ENCOUNTER — APPOINTMENT (OUTPATIENT)
Dept: CARDIOLOGY | Facility: CLINIC | Age: 63
End: 2023-02-08
Payer: COMMERCIAL

## 2023-02-08 ENCOUNTER — NON-APPOINTMENT (OUTPATIENT)
Age: 63
End: 2023-02-08

## 2023-02-08 VITALS
HEIGHT: 64 IN | DIASTOLIC BLOOD PRESSURE: 80 MMHG | SYSTOLIC BLOOD PRESSURE: 124 MMHG | OXYGEN SATURATION: 100 % | HEART RATE: 77 BPM | TEMPERATURE: 98.1 F | BODY MASS INDEX: 29.35 KG/M2

## 2023-02-08 VITALS — BODY MASS INDEX: 29.35 KG/M2 | WEIGHT: 171 LBS

## 2023-02-08 LAB
ESTIMATED AVERAGE GLUCOSE: 111 MG/DL
HBA1C MFR BLD HPLC: 5.5 %

## 2023-02-08 PROCEDURE — 99214 OFFICE O/P EST MOD 30 MIN: CPT

## 2023-02-08 PROCEDURE — 93000 ELECTROCARDIOGRAM COMPLETE: CPT

## 2023-02-08 NOTE — REASON FOR VISIT
[Hypertension] : hypertension [FreeTextEntry1] : BP well controlled on BP log.\par I personally reviewed the patient's outpatient records from the primary physician.\par 's, , A1C 5.5%.  K+ was elevated at 5.5 but no ECG changes. Recommended she discuss with PMD.\par \par 1. HTN. BP well controlled upon reevaluation. f/u 6 months. Continue amlodipine 2.5 mg daily.

## 2023-02-08 NOTE — ASSESSMENT
[FreeTextEntry1] : 1. HTN. BP well controlled upon reevaluation. f/u 6 months. Continue amlodipine 2.5 mg daily.

## 2023-02-09 ENCOUNTER — NON-APPOINTMENT (OUTPATIENT)
Age: 63
End: 2023-02-09

## 2023-02-09 LAB
ALBUMIN SERPL ELPH-MCNC: 4.7 G/DL
ALP BLD-CCNC: 72 U/L
ALT SERPL-CCNC: 17 U/L
ANION GAP SERPL CALC-SCNC: 13 MMOL/L
AST SERPL-CCNC: 20 U/L
BASOPHILS # BLD AUTO: 0.05 K/UL
BASOPHILS NFR BLD AUTO: 0.7 %
BILIRUB SERPL-MCNC: 0.4 MG/DL
BUN SERPL-MCNC: 20 MG/DL
CALCIUM SERPL-MCNC: 10.4 MG/DL
CHLORIDE SERPL-SCNC: 104 MMOL/L
CHOLEST SERPL-MCNC: 244 MG/DL
CO2 SERPL-SCNC: 25 MMOL/L
CREAT SERPL-MCNC: 0.75 MG/DL
EGFR: 89 ML/MIN/1.73M2
EOSINOPHIL # BLD AUTO: 0.21 K/UL
EOSINOPHIL NFR BLD AUTO: 3 %
GLUCOSE SERPL-MCNC: 105 MG/DL
HCT VFR BLD CALC: 42.3 %
HDLC SERPL-MCNC: 106 MG/DL
HGB BLD-MCNC: 13.8 G/DL
IMM GRANULOCYTES NFR BLD AUTO: 0.3 %
LDLC SERPL CALC-MCNC: 122 MG/DL
LYMPHOCYTES # BLD AUTO: 2.6 K/UL
LYMPHOCYTES NFR BLD AUTO: 36.6 %
MAN DIFF?: NORMAL
MCHC RBC-ENTMCNC: 28.6 PG
MCHC RBC-ENTMCNC: 32.6 GM/DL
MCV RBC AUTO: 87.6 FL
MONOCYTES # BLD AUTO: 0.68 K/UL
MONOCYTES NFR BLD AUTO: 9.6 %
NEUTROPHILS # BLD AUTO: 3.55 K/UL
NEUTROPHILS NFR BLD AUTO: 49.8 %
NONHDLC SERPL-MCNC: 138 MG/DL
PLATELET # BLD AUTO: 666 K/UL
POTASSIUM SERPL-SCNC: 5.7 MMOL/L
PROT SERPL-MCNC: 7.2 G/DL
RBC # BLD: 4.83 M/UL
RBC # FLD: 14.5 %
SODIUM SERPL-SCNC: 142 MMOL/L
TRIGL SERPL-MCNC: 78 MG/DL
WBC # FLD AUTO: 7.11 K/UL

## 2023-02-16 ENCOUNTER — NON-APPOINTMENT (OUTPATIENT)
Age: 63
End: 2023-02-16

## 2023-02-20 ENCOUNTER — APPOINTMENT (OUTPATIENT)
Dept: INTERNAL MEDICINE | Facility: CLINIC | Age: 63
End: 2023-02-20
Payer: COMMERCIAL

## 2023-02-20 VITALS
TEMPERATURE: 97.9 F | HEART RATE: 79 BPM | WEIGHT: 170 LBS | RESPIRATION RATE: 14 BRPM | SYSTOLIC BLOOD PRESSURE: 140 MMHG | OXYGEN SATURATION: 97 % | BODY MASS INDEX: 29.02 KG/M2 | DIASTOLIC BLOOD PRESSURE: 90 MMHG | HEIGHT: 64 IN

## 2023-02-20 VITALS — DIASTOLIC BLOOD PRESSURE: 80 MMHG | SYSTOLIC BLOOD PRESSURE: 138 MMHG

## 2023-02-20 DIAGNOSIS — E87.5 HYPERKALEMIA: ICD-10-CM

## 2023-02-20 DIAGNOSIS — Z80.42 FAMILY HISTORY OF MALIGNANT NEOPLASM OF PROSTATE: ICD-10-CM

## 2023-02-20 PROCEDURE — 99214 OFFICE O/P EST MOD 30 MIN: CPT | Mod: 25

## 2023-02-20 PROCEDURE — 90715 TDAP VACCINE 7 YRS/> IM: CPT

## 2023-02-20 PROCEDURE — 90471 IMMUNIZATION ADMIN: CPT

## 2023-02-20 RX ORDER — METHOCARBAMOL 750 MG/1
750 TABLET, FILM COATED ORAL TWICE DAILY
Qty: 60 | Refills: 0 | Status: DISCONTINUED | COMMUNITY
Start: 2022-11-02 | End: 2023-02-20

## 2023-02-20 NOTE — HISTORY OF PRESENT ILLNESS
[FreeTextEntry1] : establish care [de-identified] : Here to establish care. Has seasonal allergies and dust. \par \par Has osteoporosis and was on actonel and stopped after 5 yrs.

## 2023-02-20 NOTE — HEALTH RISK ASSESSMENT
[Yes] : Yes [2 - 4 times a month (2 pts)] : 2-4 times a month (2 points) [1 or 2 (0 pts)] : 1 or 2 (0 points) [0] : 2) Feeling down, depressed, or hopeless: Not at all (0) [PHQ-2 Negative - No further assessment needed] : PHQ-2 Negative - No further assessment needed [Fully functional (bathing, dressing, toileting, transferring, walking, feeding)] : Fully functional (bathing, dressing, toileting, transferring, walking, feeding) [Fully functional (using the telephone, shopping, preparing meals, housekeeping, doing laundry, using] : Fully functional and needs no help or supervision to perform IADLs (using the telephone, shopping, preparing meals, housekeeping, doing laundry, using transportation, managing medications and managing finances) [Never] : Never [[id number]] : 0

## 2023-02-21 LAB
ANION GAP SERPL CALC-SCNC: 15 MMOL/L
BASOPHILS # BLD AUTO: 0.07 K/UL
BASOPHILS NFR BLD AUTO: 0.9 %
CHLORIDE SERPL-SCNC: 104 MMOL/L
CO2 SERPL-SCNC: 21 MMOL/L
EOSINOPHIL # BLD AUTO: 0.21 K/UL
EOSINOPHIL NFR BLD AUTO: 2.6 %
HCT VFR BLD CALC: 42.5 %
HGB BLD-MCNC: 13.4 G/DL
IMM GRANULOCYTES NFR BLD AUTO: 0.4 %
LYMPHOCYTES # BLD AUTO: 2.89 K/UL
LYMPHOCYTES NFR BLD AUTO: 35.9 %
MAN DIFF?: NORMAL
MCHC RBC-ENTMCNC: 28.3 PG
MCHC RBC-ENTMCNC: 31.5 GM/DL
MCV RBC AUTO: 89.9 FL
MONOCYTES # BLD AUTO: 0.74 K/UL
MONOCYTES NFR BLD AUTO: 9.2 %
NEUTROPHILS # BLD AUTO: 4.1 K/UL
NEUTROPHILS NFR BLD AUTO: 51 %
PLATELET # BLD AUTO: 690 K/UL
POTASSIUM SERPL-SCNC: 5.1 MMOL/L
POTASSIUM SERPL-SCNC: 5.7 MMOL/L
RBC # BLD: 4.73 M/UL
RBC # FLD: 14.3 %
SODIUM SERPL-SCNC: 141 MMOL/L
WBC # FLD AUTO: 8.04 K/UL

## 2023-03-22 ENCOUNTER — OUTPATIENT (OUTPATIENT)
Dept: OUTPATIENT SERVICES | Facility: HOSPITAL | Age: 63
LOS: 1 days | Discharge: HOME | End: 2023-03-22

## 2023-03-22 DIAGNOSIS — D72.829 ELEVATED WHITE BLOOD CELL COUNT, UNSPECIFIED: ICD-10-CM

## 2023-03-23 ENCOUNTER — RESULT REVIEW (OUTPATIENT)
Age: 63
End: 2023-03-23

## 2023-03-23 ENCOUNTER — LABORATORY RESULT (OUTPATIENT)
Age: 63
End: 2023-03-23

## 2023-03-23 ENCOUNTER — APPOINTMENT (OUTPATIENT)
Dept: HEMATOLOGY ONCOLOGY | Facility: CLINIC | Age: 63
End: 2023-03-23
Payer: COMMERCIAL

## 2023-03-23 VITALS
WEIGHT: 174.17 LBS | HEART RATE: 76 BPM | DIASTOLIC BLOOD PRESSURE: 83 MMHG | TEMPERATURE: 97.2 F | BODY MASS INDEX: 29.73 KG/M2 | HEIGHT: 63.98 IN | SYSTOLIC BLOOD PRESSURE: 145 MMHG | RESPIRATION RATE: 16 BRPM | OXYGEN SATURATION: 98 %

## 2023-03-23 DIAGNOSIS — R79.89 OTHER SPECIFIED ABNORMAL FINDINGS OF BLOOD CHEMISTRY: ICD-10-CM

## 2023-03-23 LAB
BASOPHILS # BLD AUTO: 0.05 K/UL — SIGNIFICANT CHANGE UP (ref 0–0.2)
BASOPHILS NFR BLD AUTO: 0.5 % — SIGNIFICANT CHANGE UP (ref 0–2)
EOSINOPHIL # BLD AUTO: 0.22 K/UL — SIGNIFICANT CHANGE UP (ref 0–0.5)
EOSINOPHIL NFR BLD AUTO: 2.4 % — SIGNIFICANT CHANGE UP (ref 0–6)
HCT VFR BLD CALC: 39.8 % — SIGNIFICANT CHANGE UP (ref 34.5–45)
HGB BLD-MCNC: 13.1 G/DL — SIGNIFICANT CHANGE UP (ref 11.5–15.5)
IMM GRANULOCYTES NFR BLD AUTO: 0.2 % — SIGNIFICANT CHANGE UP (ref 0–0.9)
LYMPHOCYTES # BLD AUTO: 3.23 K/UL — SIGNIFICANT CHANGE UP (ref 1–3.3)
LYMPHOCYTES # BLD AUTO: 35.5 % — SIGNIFICANT CHANGE UP (ref 13–44)
MCHC RBC-ENTMCNC: 28.2 PG — SIGNIFICANT CHANGE UP (ref 27–34)
MCHC RBC-ENTMCNC: 32.9 G/DL — SIGNIFICANT CHANGE UP (ref 32–36)
MCV RBC AUTO: 85.8 FL — SIGNIFICANT CHANGE UP (ref 80–100)
MONOCYTES # BLD AUTO: 0.95 K/UL — HIGH (ref 0–0.9)
MONOCYTES NFR BLD AUTO: 10.4 % — SIGNIFICANT CHANGE UP (ref 2–14)
NEUTROPHILS # BLD AUTO: 4.63 K/UL — SIGNIFICANT CHANGE UP (ref 1.8–7.4)
NEUTROPHILS NFR BLD AUTO: 51 % — SIGNIFICANT CHANGE UP (ref 43–77)
NRBC # BLD: 0 /100 WBCS — SIGNIFICANT CHANGE UP (ref 0–0)
PLATELET # BLD AUTO: 578 K/UL — HIGH (ref 150–400)
RBC # BLD: 4.64 M/UL — SIGNIFICANT CHANGE UP (ref 3.8–5.2)
RBC # FLD: 14.6 % — HIGH (ref 10.3–14.5)
WBC # BLD: 9.1 K/UL — SIGNIFICANT CHANGE UP (ref 3.8–10.5)
WBC # FLD AUTO: 9.1 K/UL — SIGNIFICANT CHANGE UP (ref 3.8–10.5)

## 2023-03-23 PROCEDURE — 99214 OFFICE O/P EST MOD 30 MIN: CPT

## 2023-03-23 PROCEDURE — 38222 DX BONE MARROW BX & ASPIR: CPT | Mod: RT

## 2023-03-23 NOTE — HISTORY OF PRESENT ILLNESS
[de-identified] : 59yo F w/ borderline HTN here for evaluation of thrombocytosis\par \par Pt reports first being told of this in Dec 2019 - plt count was 504k. She was to have it repeated in a few months but it was delayed secondary to COVID-19 pandemic. She saw her PMD Dr. Ford and labs done on 6/16/20 showed plt count was 510k. It was elevated in 2018 - 460k. \par Pt reports feeling well. Denies any bleeding or bruising. \par She was seeing homeopathic doctor (Dr. Miguel Self [phone]) and using different drops for the last 3 years, last time was around the beginning of this year.  She also uses various supplements. Only medications are Flonase and Premarin cream. \par \par HCM:\par mammo going in July\par pap 2018 -not due this year\par colonoscopy 2018 [de-identified] : JAK2, CALR and MPL negative\par \par Stopped taking digestive enzymes. Now off gluten and dairy. Reports that she is having abdominal symptoms again recently. \par colonoscopy in 2018\par \par She got both doses of COVID vaccine - second on 4/8 \par \par Her plts were >600k\par \par

## 2023-03-23 NOTE — PROCEDURE
[Bone Marrow Biopsy] : bone marrow biopsy [Bone Marrow Aspiration] : bone marrow aspiration  [Patient] : the patient [Patient identification verified] : patient identification verified [Procedure verified and consent obtained] : procedure verified and consent obtained [Correct positioning] : correct positioning [Prone] : prone [The right posterior iliac crest was prepped with betadine and draped, using sterile technique.] : The right posterior iliac crest was prepped with betadine and draped, using sterile technique. [Lidocaine was injected and into the periosteum overlying the site.] : Lidocaine was injected and into the periosteum overlying the site. [Aspirate] : aspirate [Cytogenetics] : cytogenetics [FISH] : FISH [Other ___] : [unfilled] [Biopsy] : biopsy [Flow Cytometry] : flow cytometry [] : The patient was instructed to remove the bandage the following AM. The patient may bathe. Acetaminophen may be taken for discomfort, as per package directions.If there are any other problems, the patient was instructed to call the office. The patient verbalized understanding, and is aware of the office contact numbers. [FreeTextEntry1] : 61 yo female w/thrombocytosis, r/o MPN [FreeTextEntry2] : CBC prior to procedure\par WBC 9.10\par Hgb  13.1 Hct 39.8\par Plt 578k\par BM Bx and aspiration was performed by ULISSES Davidson. 2 lavender + 2 green top tubes of BM aspirate and 1 cassette of BM core specimen sent to lab. 10ml 1% Lidocaine injected at the biopsy site.\par

## 2023-03-23 NOTE — REASON FOR VISIT
[Bone Marrow Biopsy] : bone marrow biopsy [Bone Marrow Aspiration] : bone marrow aspiration [FreeTextEntry2] : 61 yo female w/thrombocytosis, r/o MPN

## 2023-03-23 NOTE — ASSESSMENT
[FreeTextEntry1] : 61yo F w/ borderline HTN here for f/u of thrombocytosis\par JAK2, MPL, CALR negative\par Her plts with PMD were >600k last month. Today it's 578k. We discussed doing a BMbx to r/o MPN. Pt agreeable -asked to be done today as pt's  is getting surgery next week. \par Will call pt with results

## 2023-04-05 LAB
BASOPHILS # BLD AUTO: 0.04 K/UL
BASOPHILS NFR BLD AUTO: 0.5 %
EOSINOPHIL # BLD AUTO: 0.22 K/UL
EOSINOPHIL NFR BLD AUTO: 2.6 %
HCT VFR BLD CALC: 41.9 %
HGB BLD-MCNC: 13.1 G/DL
IMM GRANULOCYTES NFR BLD AUTO: 0.2 %
LYMPHOCYTES # BLD AUTO: 2.99 K/UL
LYMPHOCYTES NFR BLD AUTO: 35.5 %
MAN DIFF?: NORMAL
MCHC RBC-ENTMCNC: 28.6 PG
MCHC RBC-ENTMCNC: 31.3 GM/DL
MCV RBC AUTO: 91.5 FL
MONOCYTES # BLD AUTO: 0.83 K/UL
MONOCYTES NFR BLD AUTO: 9.8 %
NEUTROPHILS # BLD AUTO: 4.33 K/UL
NEUTROPHILS NFR BLD AUTO: 51.4 %
PLATELET # BLD AUTO: 669 K/UL
RBC # BLD: 4.58 M/UL
RBC # FLD: 15 %
WBC # FLD AUTO: 8.43 K/UL

## 2023-04-10 LAB — MPL EXON 10 MUTATION: NORMAL

## 2023-05-01 ENCOUNTER — RESULT REVIEW (OUTPATIENT)
Age: 63
End: 2023-05-01

## 2023-05-01 ENCOUNTER — APPOINTMENT (OUTPATIENT)
Dept: HEMATOLOGY ONCOLOGY | Facility: CLINIC | Age: 63
End: 2023-05-01
Payer: COMMERCIAL

## 2023-05-01 VITALS
RESPIRATION RATE: 16 BRPM | HEART RATE: 75 BPM | TEMPERATURE: 97.8 F | DIASTOLIC BLOOD PRESSURE: 88 MMHG | OXYGEN SATURATION: 99 % | WEIGHT: 175.02 LBS | BODY MASS INDEX: 30.07 KG/M2 | SYSTOLIC BLOOD PRESSURE: 137 MMHG

## 2023-05-01 LAB
BASOPHILS # BLD AUTO: 0.06 K/UL — SIGNIFICANT CHANGE UP (ref 0–0.2)
BASOPHILS NFR BLD AUTO: 0.7 % — SIGNIFICANT CHANGE UP (ref 0–2)
EOSINOPHIL # BLD AUTO: 0.23 K/UL — SIGNIFICANT CHANGE UP (ref 0–0.5)
EOSINOPHIL NFR BLD AUTO: 2.6 % — SIGNIFICANT CHANGE UP (ref 0–6)
HCT VFR BLD CALC: 39.7 % — SIGNIFICANT CHANGE UP (ref 34.5–45)
HGB BLD-MCNC: 13.3 G/DL — SIGNIFICANT CHANGE UP (ref 11.5–15.5)
IMM GRANULOCYTES NFR BLD AUTO: 0.2 % — SIGNIFICANT CHANGE UP (ref 0–0.9)
LYMPHOCYTES # BLD AUTO: 3.16 K/UL — SIGNIFICANT CHANGE UP (ref 1–3.3)
LYMPHOCYTES # BLD AUTO: 36.2 % — SIGNIFICANT CHANGE UP (ref 13–44)
MCHC RBC-ENTMCNC: 28.5 PG — SIGNIFICANT CHANGE UP (ref 27–34)
MCHC RBC-ENTMCNC: 33.5 G/DL — SIGNIFICANT CHANGE UP (ref 32–36)
MCV RBC AUTO: 85.2 FL — SIGNIFICANT CHANGE UP (ref 80–100)
MONOCYTES # BLD AUTO: 0.84 K/UL — SIGNIFICANT CHANGE UP (ref 0–0.9)
MONOCYTES NFR BLD AUTO: 9.6 % — SIGNIFICANT CHANGE UP (ref 2–14)
NEUTROPHILS # BLD AUTO: 4.42 K/UL — SIGNIFICANT CHANGE UP (ref 1.8–7.4)
NEUTROPHILS NFR BLD AUTO: 50.7 % — SIGNIFICANT CHANGE UP (ref 43–77)
NRBC # BLD: 0 /100 WBCS — SIGNIFICANT CHANGE UP (ref 0–0)
PLATELET # BLD AUTO: 531 K/UL — HIGH (ref 150–400)
RBC # BLD: 4.66 M/UL — SIGNIFICANT CHANGE UP (ref 3.8–5.2)
RBC # FLD: 15.4 % — HIGH (ref 10.3–14.5)
WBC # BLD: 8.73 K/UL — SIGNIFICANT CHANGE UP (ref 3.8–10.5)
WBC # FLD AUTO: 8.73 K/UL — SIGNIFICANT CHANGE UP (ref 3.8–10.5)

## 2023-05-01 PROCEDURE — 99214 OFFICE O/P EST MOD 30 MIN: CPT

## 2023-05-09 NOTE — ASSESSMENT
[FreeTextEntry1] : 63yo F w/ borderline HTN here for f/u of thrombocytosis\par JAK2, CALR negative, MPL postive \par Her plts with PMD were >600k last month.  BMbx done on 3/23/23 found MPL mutation. \par Started HU 500mg daily on 4/3/23,tolerated it well; today platelet trending down slightly to 531k. Advised pt to cont currently dose.\par RTC in 1 month\par \par Case and management discussed with Dr. Cummings\par

## 2023-05-09 NOTE — HISTORY OF PRESENT ILLNESS
[de-identified] : 59yo F w/ borderline HTN here for evaluation of thrombocytosis\par \par Pt reports first being told of this in Dec 2019 - plt count was 504k. She was to have it repeated in a few months but it was delayed secondary to COVID-19 pandemic. She saw her PMD Dr. Ford and labs done on 6/16/20 showed plt count was 510k. It was elevated in 2018 - 460k. \par Pt reports feeling well. Denies any bleeding or bruising. \par She was seeing homeopathic doctor (Dr. Miguel Self [phone]) and using different drops for the last 3 years, last time was around the beginning of this year.  She also uses various supplements. Only medications are Flonase and Premarin cream. \par \par HCM:\par mammo going in July\par pap 2018 -not due this year\par colonoscopy 2018 [de-identified] : JAK2, CALR and MPL negative\par \par Stopped taking digestive enzymes. Now off gluten and dairy. Reports that she is having abdominal symptoms again recently. \par colonoscopy in 2018\par \par She got both doses of COVID vaccine - second on 4/8 \par \par Her plts were >600k\par \par Bmtx done on 3/23/23:\par 1, 2. Bone marrow biopsy and bone marrow aspirate - Mildly hypercellular marrow with trilineage hematopoiesis with maturation, mild megakaryocytosis (clustering), and increased iron stores; consistent with early stage of myeloproliferative neoplasm.\par \par NGS myeloid panel: Tier I: Variants of Strong Clinical Significance\par   MPL p.[id number]\par   Tier III: Variants of Unknown Clinical Significance\par   EZH2 p.[id number]\par \par 5/1/23: patient was seen today for a f/u apt accompanied by her . She started HU 500mg daily on 4/3/23, c/o mild common cold symptoms and felt nauseated the first few days after starting HU, symptoms self-resolved now. Denied any complaints. \par \par \par \par \par

## 2023-05-17 ENCOUNTER — OUTPATIENT (OUTPATIENT)
Dept: OUTPATIENT SERVICES | Facility: HOSPITAL | Age: 63
LOS: 1 days | Discharge: HOME | End: 2023-05-17

## 2023-05-17 DIAGNOSIS — D72.829 ELEVATED WHITE BLOOD CELL COUNT, UNSPECIFIED: ICD-10-CM

## 2023-05-30 ENCOUNTER — RX RENEWAL (OUTPATIENT)
Age: 63
End: 2023-05-30

## 2023-06-01 ENCOUNTER — APPOINTMENT (OUTPATIENT)
Dept: HEMATOLOGY ONCOLOGY | Facility: CLINIC | Age: 63
End: 2023-06-01
Payer: COMMERCIAL

## 2023-06-01 ENCOUNTER — RESULT REVIEW (OUTPATIENT)
Age: 63
End: 2023-06-01

## 2023-06-01 ENCOUNTER — NON-APPOINTMENT (OUTPATIENT)
Age: 63
End: 2023-06-01

## 2023-06-01 VITALS
OXYGEN SATURATION: 99 % | TEMPERATURE: 97 F | BODY MASS INDEX: 30.3 KG/M2 | RESPIRATION RATE: 16 BRPM | HEART RATE: 76 BPM | WEIGHT: 176.37 LBS | SYSTOLIC BLOOD PRESSURE: 142 MMHG | DIASTOLIC BLOOD PRESSURE: 83 MMHG

## 2023-06-01 LAB
BASOPHILS # BLD AUTO: 0.06 K/UL — SIGNIFICANT CHANGE UP (ref 0–0.2)
BASOPHILS NFR BLD AUTO: 0.6 % — SIGNIFICANT CHANGE UP (ref 0–2)
EOSINOPHIL # BLD AUTO: 0.22 K/UL — SIGNIFICANT CHANGE UP (ref 0–0.5)
EOSINOPHIL NFR BLD AUTO: 2.3 % — SIGNIFICANT CHANGE UP (ref 0–6)
HCT VFR BLD CALC: 39.1 % — SIGNIFICANT CHANGE UP (ref 34.5–45)
HGB BLD-MCNC: 12.8 G/DL — SIGNIFICANT CHANGE UP (ref 11.5–15.5)
IMM GRANULOCYTES NFR BLD AUTO: 0.2 % — SIGNIFICANT CHANGE UP (ref 0–0.9)
LYMPHOCYTES # BLD AUTO: 3.36 K/UL — HIGH (ref 1–3.3)
LYMPHOCYTES # BLD AUTO: 34.8 % — SIGNIFICANT CHANGE UP (ref 13–44)
MCHC RBC-ENTMCNC: 28.8 PG — SIGNIFICANT CHANGE UP (ref 27–34)
MCHC RBC-ENTMCNC: 32.7 G/DL — SIGNIFICANT CHANGE UP (ref 32–36)
MCV RBC AUTO: 87.9 FL — SIGNIFICANT CHANGE UP (ref 80–100)
MONOCYTES # BLD AUTO: 0.96 K/UL — HIGH (ref 0–0.9)
MONOCYTES NFR BLD AUTO: 9.9 % — SIGNIFICANT CHANGE UP (ref 2–14)
NEUTROPHILS # BLD AUTO: 5.04 K/UL — SIGNIFICANT CHANGE UP (ref 1.8–7.4)
NEUTROPHILS NFR BLD AUTO: 52.2 % — SIGNIFICANT CHANGE UP (ref 43–77)
NRBC # BLD: 0 /100 WBCS — SIGNIFICANT CHANGE UP (ref 0–0)
PLATELET # BLD AUTO: 555 K/UL — HIGH (ref 150–400)
RBC # BLD: 4.45 M/UL — SIGNIFICANT CHANGE UP (ref 3.8–5.2)
RBC # FLD: 16.2 % — HIGH (ref 10.3–14.5)
WBC # BLD: 9.66 K/UL — SIGNIFICANT CHANGE UP (ref 3.8–10.5)
WBC # FLD AUTO: 9.66 K/UL — SIGNIFICANT CHANGE UP (ref 3.8–10.5)

## 2023-06-01 PROCEDURE — 99214 OFFICE O/P EST MOD 30 MIN: CPT

## 2023-06-01 NOTE — ASSESSMENT
[FreeTextEntry1] : 61yo F w/ borderline HTN here for f/u of thrombocytosis. JAK2, CALR, MPL negative\par Her plts with PMD were >600k in 2/2023.  BMbx done on 3/23/23 found early stage of myeloproliferative neoplasm with MPL mutation. \par Interestingly MPL mutation was negative in PB -this was repeated and still negative\par Started HU 500mg daily on 4/3/23, not tolerating it very well; today platelet up slightly to 555k. Will increase dose to 2tabs daily and will try at night time to see if feels less crummy during the day\par All questions answered\par RTC in 1 month\par

## 2023-06-01 NOTE — HISTORY OF PRESENT ILLNESS
[de-identified] : 59yo F w/ borderline HTN here for evaluation of thrombocytosis\par \par Pt reports first being told of this in Dec 2019 - plt count was 504k. She was to have it repeated in a few months but it was delayed secondary to COVID-19 pandemic. She saw her PMD Dr. Ford and labs done on 6/16/20 showed plt count was 510k. It was elevated in 2018 - 460k. \par Pt reports feeling well. Denies any bleeding or bruising. \par She was seeing homeopathic doctor (Dr. Miguel Self [phone]) and using different drops for the last 3 years, last time was around the beginning of this year.  She also uses various supplements. Only medications are Flonase and Premarin cream. \par \par HCM:\par mammo going in July\par pap 2018 -not due this year\par colonoscopy 2018 [de-identified] : JAK2, CALR and MPL negative\par \par Her plts were >600k so we did Bmbx on 3/23/23:\par 1, 2. Bone marrow biopsy and bone marrow aspirate - Mildly hypercellular marrow with trilineage hematopoiesis with maturation, mild megakaryocytosis (clustering), and increased iron stores; consistent with early stage of myeloproliferative neoplasm.\par \par NGS myeloid panel: Tier I: Variants of Strong Clinical Significance\par   MPL p.[id number]\par   Tier III: Variants of Unknown Clinical Significance\par   EZH2 p.[id number]\par \par 5/1/23: patient was seen today for a f/u apt accompanied by her . She started HU 500mg daily on 4/3/23, c/o mild common cold symptoms and felt nauseated the first few days after starting HU, symptoms self-resolved now. Denied any complaints. \par \par 6/1: she feels unwell on the HU. Also has noticed some weight gain. \par \par

## 2023-06-02 LAB
ALBUMIN SERPL ELPH-MCNC: 4.3 G/DL
ALP BLD-CCNC: 66 U/L
ALT SERPL-CCNC: 18 U/L
ANION GAP SERPL CALC-SCNC: 12 MMOL/L
AST SERPL-CCNC: 19 U/L
BILIRUB SERPL-MCNC: 0.3 MG/DL
BUN SERPL-MCNC: 25 MG/DL
CALCIUM SERPL-MCNC: 9.5 MG/DL
CHLORIDE SERPL-SCNC: 106 MMOL/L
CO2 SERPL-SCNC: 23 MMOL/L
CREAT SERPL-MCNC: 0.71 MG/DL
EGFR: 94 ML/MIN/1.73M2
GLUCOSE SERPL-MCNC: 102 MG/DL
POTASSIUM SERPL-SCNC: 4.9 MMOL/L
PROT SERPL-MCNC: 6.6 G/DL
SODIUM SERPL-SCNC: 141 MMOL/L

## 2023-07-03 ENCOUNTER — RESULT REVIEW (OUTPATIENT)
Age: 63
End: 2023-07-03

## 2023-07-03 ENCOUNTER — APPOINTMENT (OUTPATIENT)
Dept: HEMATOLOGY ONCOLOGY | Facility: CLINIC | Age: 63
End: 2023-07-03
Payer: MEDICARE

## 2023-07-03 VITALS
SYSTOLIC BLOOD PRESSURE: 131 MMHG | DIASTOLIC BLOOD PRESSURE: 83 MMHG | RESPIRATION RATE: 16 BRPM | BODY MASS INDEX: 29.97 KG/M2 | HEIGHT: 63.98 IN | HEART RATE: 77 BPM | TEMPERATURE: 97 F | WEIGHT: 175.55 LBS | OXYGEN SATURATION: 100 %

## 2023-07-03 LAB
BASOPHILS # BLD AUTO: 0.05 K/UL — SIGNIFICANT CHANGE UP (ref 0–0.2)
BASOPHILS NFR BLD AUTO: 0.6 % — SIGNIFICANT CHANGE UP (ref 0–2)
EOSINOPHIL # BLD AUTO: 0.19 K/UL — SIGNIFICANT CHANGE UP (ref 0–0.5)
EOSINOPHIL NFR BLD AUTO: 2.3 % — SIGNIFICANT CHANGE UP (ref 0–6)
HCT VFR BLD CALC: 39.4 % — SIGNIFICANT CHANGE UP (ref 34.5–45)
HGB BLD-MCNC: 13 G/DL — SIGNIFICANT CHANGE UP (ref 11.5–15.5)
IMM GRANULOCYTES NFR BLD AUTO: 0.4 % — SIGNIFICANT CHANGE UP (ref 0–0.9)
LYMPHOCYTES # BLD AUTO: 3.38 K/UL — HIGH (ref 1–3.3)
LYMPHOCYTES # BLD AUTO: 40.1 % — SIGNIFICANT CHANGE UP (ref 13–44)
MCHC RBC-ENTMCNC: 29.7 PG — SIGNIFICANT CHANGE UP (ref 27–34)
MCHC RBC-ENTMCNC: 33 G/DL — SIGNIFICANT CHANGE UP (ref 32–36)
MCV RBC AUTO: 90.2 FL — SIGNIFICANT CHANGE UP (ref 80–100)
MONOCYTES # BLD AUTO: 0.7 K/UL — SIGNIFICANT CHANGE UP (ref 0–0.9)
MONOCYTES NFR BLD AUTO: 8.3 % — SIGNIFICANT CHANGE UP (ref 2–14)
NEUTROPHILS # BLD AUTO: 4.08 K/UL — SIGNIFICANT CHANGE UP (ref 1.8–7.4)
NEUTROPHILS NFR BLD AUTO: 48.3 % — SIGNIFICANT CHANGE UP (ref 43–77)
NRBC # BLD: 0 /100 WBCS — SIGNIFICANT CHANGE UP (ref 0–0)
PLATELET # BLD AUTO: 456 K/UL — HIGH (ref 150–400)
RBC # BLD: 4.37 M/UL — SIGNIFICANT CHANGE UP (ref 3.8–5.2)
RBC # FLD: 17.1 % — HIGH (ref 10.3–14.5)
WBC # BLD: 8.43 K/UL — SIGNIFICANT CHANGE UP (ref 3.8–10.5)
WBC # FLD AUTO: 8.43 K/UL — SIGNIFICANT CHANGE UP (ref 3.8–10.5)

## 2023-07-03 PROCEDURE — 99213 OFFICE O/P EST LOW 20 MIN: CPT

## 2023-07-03 RX ORDER — HYDROXYUREA 500 MG/1
500 CAPSULE ORAL DAILY
Qty: 60 | Refills: 1 | Status: ACTIVE | COMMUNITY
Start: 2023-04-03 | End: 1900-01-01

## 2023-07-05 NOTE — ASSESSMENT
[FreeTextEntry1] : 63yo F w/ borderline HTN here for f/u of thrombocytosis. JAK2, CALR, MPL negative\par Her plts with PMD were >600k in 2/2023.  BMbx done on 3/23/23 found early stage of myeloproliferative neoplasm with MPL mutation. \par Interestingly MPL mutation was negative in PB -this was repeated and still negative\par Started HU 500mg daily on 4/3/23, platelet up slightly to 555k last month. Increased dose to 2tabs daily on 6/1/23, she didn't felt well with HU last month, but the crummy feeling disappeared after she stopped fish oil. \par CBC today showed platelet improved 456k, cont HU 2000mg daily\par All questions answered\par RTC in 1 month\par \par Case and management discussed with Dr. Cummings\par \par

## 2023-07-05 NOTE — HISTORY OF PRESENT ILLNESS
[de-identified] : 59yo F w/ borderline HTN here for evaluation of thrombocytosis\par \par Pt reports first being told of this in Dec 2019 - plt count was 504k. She was to have it repeated in a few months but it was delayed secondary to COVID-19 pandemic. She saw her PMD Dr. Ford and labs done on 6/16/20 showed plt count was 510k. It was elevated in 2018 - 460k. \par Pt reports feeling well. Denies any bleeding or bruising. \par She was seeing homeopathic doctor (Dr. Miguel Self [phone]) and using different drops for the last 3 years, last time was around the beginning of this year.  She also uses various supplements. Only medications are Flonase and Premarin cream. \par \par HCM:\par mammo going in July\par pap 2018 -not due this year\par colonoscopy 2018 [de-identified] : JAK2, CALR and MPL negative\par \par Her plts were >600k so we did Bmbx on 3/23/23:\par 1, 2. Bone marrow biopsy and bone marrow aspirate - Mildly hypercellular marrow with trilineage hematopoiesis with maturation, mild megakaryocytosis (clustering), and increased iron stores; consistent with early stage of myeloproliferative neoplasm.\par \par NGS myeloid panel: Tier I: Variants of Strong Clinical Significance\par   MPL p.[id number]\par   Tier III: Variants of Unknown Clinical Significance\par   EZH2 p.[id number]\par \par 5/1/23: patient was seen today for a f/u apt accompanied by her . She started HU 500mg daily on 4/3/23, c/o mild common cold symptoms and felt nauseated the first few days after starting HU, symptoms self-resolved now. Denied any complaints. \par \par 6/1: she feels unwell on the HU. Also has noticed some weight gain. \par \par 7/3:  Patient is seen today for a f/u apt accompanied by her . She feels well overall. She took HU 2 cap 1000mg daily at lunch time since last visit, tolerated it well. She stopped taking fish oil, she stated she felt fine with HU now since she stopped fish oil. \par \par

## 2023-07-06 ENCOUNTER — APPOINTMENT (OUTPATIENT)
Dept: OBGYN | Facility: CLINIC | Age: 63
End: 2023-07-06
Payer: MEDICARE

## 2023-07-06 VITALS
BODY MASS INDEX: 30.24 KG/M2 | HEIGHT: 63.98 IN | DIASTOLIC BLOOD PRESSURE: 83 MMHG | SYSTOLIC BLOOD PRESSURE: 133 MMHG | WEIGHT: 177.13 LBS

## 2023-07-06 DIAGNOSIS — N95.2 POSTMENOPAUSAL ATROPHIC VAGINITIS: ICD-10-CM

## 2023-07-06 DIAGNOSIS — Z01.419 ENCOUNTER FOR GYNECOLOGICAL EXAMINATION (GENERAL) (ROUTINE) W/OUT ABNORMAL FINDINGS: ICD-10-CM

## 2023-07-06 LAB
ALBUMIN SERPL ELPH-MCNC: 4.5 G/DL
ALP BLD-CCNC: 66 U/L
ALT SERPL-CCNC: 16 U/L
ANION GAP SERPL CALC-SCNC: 14 MMOL/L
AST SERPL-CCNC: 21 U/L
BILIRUB SERPL-MCNC: 0.2 MG/DL
BUN SERPL-MCNC: 26 MG/DL
CALCIUM SERPL-MCNC: 9.8 MG/DL
CHLORIDE SERPL-SCNC: 105 MMOL/L
CO2 SERPL-SCNC: 23 MMOL/L
CREAT SERPL-MCNC: 0.67 MG/DL
EGFR: 97 ML/MIN/1.73M2
GLUCOSE SERPL-MCNC: 75 MG/DL
POTASSIUM SERPL-SCNC: 5.2 MMOL/L
PROT SERPL-MCNC: 6.6 G/DL
SODIUM SERPL-SCNC: 142 MMOL/L

## 2023-07-06 PROCEDURE — 99397 PER PM REEVAL EST PAT 65+ YR: CPT

## 2023-07-06 RX ORDER — ESTRADIOL 0.1 MG/G
0.1 CREAM VAGINAL
Qty: 1 | Refills: 3 | Status: ACTIVE | COMMUNITY
Start: 2023-07-06 | End: 1900-01-01

## 2023-07-06 NOTE — HISTORY OF PRESENT ILLNESS
[Mammogramdate] : 7/22 [PapSmeardate] : 2021 [BoneDensityDate] : 2021 [ColonoscopyDate] : 2018 [Currently Active] : currently active

## 2023-07-19 ENCOUNTER — APPOINTMENT (OUTPATIENT)
Dept: PULMONOLOGY | Facility: CLINIC | Age: 63
End: 2023-07-19
Payer: MEDICARE

## 2023-07-19 VITALS
OXYGEN SATURATION: 98 % | BODY MASS INDEX: 31.01 KG/M2 | RESPIRATION RATE: 17 BRPM | DIASTOLIC BLOOD PRESSURE: 84 MMHG | HEART RATE: 68 BPM | SYSTOLIC BLOOD PRESSURE: 155 MMHG | HEIGHT: 63 IN | WEIGHT: 175 LBS

## 2023-07-19 DIAGNOSIS — J30.9 ALLERGIC RHINITIS, UNSPECIFIED: ICD-10-CM

## 2023-07-19 PROCEDURE — 99204 OFFICE O/P NEW MOD 45 MIN: CPT

## 2023-07-19 RX ORDER — ASPIRIN 81 MG/1
81 TABLET, CHEWABLE ORAL
Refills: 0 | Status: ACTIVE | COMMUNITY

## 2023-07-19 RX ORDER — AZELASTINE HYDROCHLORIDE 137 UG/1
137 SPRAY, METERED NASAL
Qty: 30 | Refills: 0 | Status: COMPLETED | COMMUNITY
Start: 2022-06-20 | End: 2023-07-19

## 2023-07-19 RX ORDER — MOMETASONE FUROATE 1 MG/ML
0.1 SOLUTION TOPICAL
Qty: 30 | Refills: 0 | Status: COMPLETED | COMMUNITY
Start: 2022-06-20 | End: 2023-07-19

## 2023-07-19 NOTE — CONSULT LETTER
[Dear  ___] : Dear  [unfilled], [Consult Letter:] : I had the pleasure of evaluating your patient, [unfilled]. [Please see my note below.] : Please see my note below. [Consult Closing:] : Thank you very much for allowing me to participate in the care of this patient.  If you have any questions, please do not hesitate to contact me. [Sincerely,] : Sincerely, [FreeTextEntry2] : Dr. Ifeoma Denney MD [FreeTextEntry3] : Bradford Roberts MD\par Attending Physician in Pulmonary & Critical Care Medicine\par  of Medicine\par [employer] [facility]

## 2023-07-19 NOTE — ASSESSMENT
[FreeTextEntry1] : Mrs. Robles is a 63 year-old female with a history of HTN, allergic rhinitis, and thrombocytosis likely due to myeloproliferative neoplasm on Hydroxyurea who presents for evaluation. She had a bone marrow biopsy in March that was consistent with early stae of myeloproliferative neoplasm. She had a CTCA last year that revealed at nonspecific left lower lobe 0.3 cm nodule. She is asymptomatic. 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

## 2023-07-19 NOTE — PHYSICAL EXAM
[No Acute Distress] : no acute distress [Well Nourished] : well nourished [Normal Oropharynx] : normal oropharynx [Well Developed] : well developed [Normal Appearance] : normal appearance [Supple] : supple [No Neck Mass] : no neck mass [No JVD] : no jvd [Normal Rate/Rhythm] : normal rate/rhythm [Normal S1, S2] : normal s1, s2 [Normal Pulses] : normal pulses [No Murmurs] : no murmurs [No Resp Distress] : no resp distress [No Abnormalities] : no abnormalities [Clear to Auscultation Bilaterally] : clear to auscultation bilaterally [Benign] : benign [Normal Gait] : normal gait [No Clubbing] : no clubbing [No Cyanosis] : no cyanosis [FROM] : FROM [Normal Color/ Pigmentation] : normal color/ pigmentation [No Focal Deficits] : no focal deficits [Oriented x3] : oriented x3 [Normal Affect] : normal affect [TextBox_105] : +pedal edema

## 2023-07-19 NOTE — HISTORY OF PRESENT ILLNESS
[TextBox_4] : Mrs. Robles is a 63 year-old female with a history of HTN, allergic rhinitis, and thrombocytosis likely due to myeloproliferative neoplasm on Hydroxyurea who presents for evaluation. She had a bone marrow biopsy in March that was consistent with early stage of myeloproliferative neoplasm. She had a CTCA last year that revealed at nonspecific left lower lobe 0.3 cm nodule. She is asymptomatic. No fevers, chills, dyspnea, cough, wheezing, or hemoptysis.

## 2023-07-22 ENCOUNTER — APPOINTMENT (OUTPATIENT)
Dept: CT IMAGING | Facility: CLINIC | Age: 63
End: 2023-07-22
Payer: MEDICARE

## 2023-07-22 PROCEDURE — 71250 CT THORAX DX C-: CPT

## 2023-07-24 ENCOUNTER — RESULT REVIEW (OUTPATIENT)
Age: 63
End: 2023-07-24

## 2023-07-24 ENCOUNTER — APPOINTMENT (OUTPATIENT)
Dept: RADIOLOGY | Facility: CLINIC | Age: 63
End: 2023-07-24
Payer: MEDICARE

## 2023-07-24 ENCOUNTER — APPOINTMENT (OUTPATIENT)
Dept: MAMMOGRAPHY | Facility: CLINIC | Age: 63
End: 2023-07-24
Payer: MEDICARE

## 2023-07-24 PROCEDURE — 77085 DXA BONE DENSITY AXL VRT FX: CPT

## 2023-07-24 PROCEDURE — 77067 SCR MAMMO BI INCL CAD: CPT

## 2023-07-24 PROCEDURE — 77063 BREAST TOMOSYNTHESIS BI: CPT

## 2023-08-05 ENCOUNTER — OUTPATIENT (OUTPATIENT)
Dept: OUTPATIENT SERVICES | Facility: HOSPITAL | Age: 63
LOS: 1 days | Discharge: HOME | End: 2023-08-05

## 2023-08-05 DIAGNOSIS — D72.829 ELEVATED WHITE BLOOD CELL COUNT, UNSPECIFIED: ICD-10-CM

## 2023-08-07 ENCOUNTER — RESULT REVIEW (OUTPATIENT)
Age: 63
End: 2023-08-07

## 2023-08-07 ENCOUNTER — APPOINTMENT (OUTPATIENT)
Dept: HEMATOLOGY ONCOLOGY | Facility: CLINIC | Age: 63
End: 2023-08-07
Payer: MEDICARE

## 2023-08-07 LAB
BASOPHILS # BLD AUTO: 0.04 K/UL — SIGNIFICANT CHANGE UP (ref 0–0.2)
BASOPHILS NFR BLD AUTO: 0.6 % — SIGNIFICANT CHANGE UP (ref 0–2)
EOSINOPHIL # BLD AUTO: 0.14 K/UL — SIGNIFICANT CHANGE UP (ref 0–0.5)
EOSINOPHIL NFR BLD AUTO: 2 % — SIGNIFICANT CHANGE UP (ref 0–6)
HCT VFR BLD CALC: 38.4 % — SIGNIFICANT CHANGE UP (ref 34.5–45)
HGB BLD-MCNC: 12.6 G/DL — SIGNIFICANT CHANGE UP (ref 11.5–15.5)
IMM GRANULOCYTES NFR BLD AUTO: 0.1 % — SIGNIFICANT CHANGE UP (ref 0–0.9)
LYMPHOCYTES # BLD AUTO: 2.83 K/UL — SIGNIFICANT CHANGE UP (ref 1–3.3)
LYMPHOCYTES # BLD AUTO: 39.5 % — SIGNIFICANT CHANGE UP (ref 13–44)
MCHC RBC-ENTMCNC: 30.7 PG — SIGNIFICANT CHANGE UP (ref 27–34)
MCHC RBC-ENTMCNC: 32.8 G/DL — SIGNIFICANT CHANGE UP (ref 32–36)
MCV RBC AUTO: 93.4 FL — SIGNIFICANT CHANGE UP (ref 80–100)
MONOCYTES # BLD AUTO: 0.66 K/UL — SIGNIFICANT CHANGE UP (ref 0–0.9)
MONOCYTES NFR BLD AUTO: 9.2 % — SIGNIFICANT CHANGE UP (ref 2–14)
NEUTROPHILS # BLD AUTO: 3.48 K/UL — SIGNIFICANT CHANGE UP (ref 1.8–7.4)
NEUTROPHILS NFR BLD AUTO: 48.6 % — SIGNIFICANT CHANGE UP (ref 43–77)
NRBC # BLD: 0 /100 WBCS — SIGNIFICANT CHANGE UP (ref 0–0)
PLATELET # BLD AUTO: 381 K/UL — SIGNIFICANT CHANGE UP (ref 150–400)
RBC # BLD: 4.11 M/UL — SIGNIFICANT CHANGE UP (ref 3.8–5.2)
RBC # FLD: 16.3 % — HIGH (ref 10.3–14.5)
WBC # BLD: 7.16 K/UL — SIGNIFICANT CHANGE UP (ref 3.8–10.5)
WBC # FLD AUTO: 7.16 K/UL — SIGNIFICANT CHANGE UP (ref 3.8–10.5)

## 2023-08-07 PROCEDURE — 99214 OFFICE O/P EST MOD 30 MIN: CPT

## 2023-08-07 NOTE — HISTORY OF PRESENT ILLNESS
[de-identified] : 61yo F w/ borderline HTN here for evaluation of thrombocytosis\par  \par  Pt reports first being told of this in Dec 2019 - plt count was 504k. She was to have it repeated in a few months but it was delayed secondary to COVID-19 pandemic. She saw her PMD Dr. Ford and labs done on 6/16/20 showed plt count was 510k. It was elevated in 2018 - 460k. \par  Pt reports feeling well. Denies any bleeding or bruising. \par  She was seeing homeopathic doctor (Dr. Miguel Self [phone]) and using different drops for the last 3 years, last time was around the beginning of this year.  She also uses various supplements. Only medications are Flonase and Premarin cream. \par  \par  HCM:\par  mammo going in July\par  pap 2018 -not due this year\par  colonoscopy 2018 [de-identified] : JAK2, CALR and MPL negative  Her plts were >600k so we did Bmbx on 3/23/23: 1, 2. Bone marrow biopsy and bone marrow aspirate - Mildly hypercellular marrow with trilineage hematopoiesis with maturation, mild megakaryocytosis (clustering), and increased iron stores; consistent with early stage of myeloproliferative neoplasm.  NGS myeloid panel: Tier I: Variants of Strong Clinical Significance   MPL p.[id number]   Tier III: Variants of Unknown Clinical Significance   EZH2 p.[id number]  5/1/23: patient was seen today for a f/u apt accompanied by her . She started HU 500mg daily on 4/3/23, c/o mild common cold symptoms and felt nauseated the first few days after starting HU, symptoms self-resolved now. Denied any complaints.   6/1: she feels unwell on the HU. Also has noticed some weight gain.   7/3:  Patient is seen today for a f/u apt accompanied by her . She feels well overall. She took HU 2 cap 1000mg daily at lunch time since last visit, tolerated it well. She stopped taking fish oil, she stated she felt fine with HU now since she stopped fish oil.   8/7: She feels well. Trying to lose weight.

## 2023-08-07 NOTE — ASSESSMENT
[FreeTextEntry1] : 63yo F w/ borderline HTN here for f/u of thrombocytosis. JAK2, CALR, MPL negative Her plts with PMD were >600k in 2/2023.  BMbx done on 3/23/23 found early stage of myeloproliferative neoplasm with MPL mutation.  Interestingly MPL mutation was negative in PB -this was repeated and still negative Started HU 500mg daily on 4/3/23, increased dose to 2 tabs daily on 6/1/23. CBC today showed platelet improved 381k, cont HU 1000mg daily Check abd sono to assess spleen size All questions answered RTC in 1 month

## 2023-08-09 ENCOUNTER — NON-APPOINTMENT (OUTPATIENT)
Age: 63
End: 2023-08-09

## 2023-08-09 ENCOUNTER — APPOINTMENT (OUTPATIENT)
Dept: CARDIOLOGY | Facility: CLINIC | Age: 63
End: 2023-08-09
Payer: MEDICARE

## 2023-08-09 VITALS
OXYGEN SATURATION: 100 % | SYSTOLIC BLOOD PRESSURE: 136 MMHG | DIASTOLIC BLOOD PRESSURE: 85 MMHG | HEART RATE: 73 BPM | WEIGHT: 174 LBS | BODY MASS INDEX: 30.83 KG/M2 | HEIGHT: 63 IN

## 2023-08-09 PROCEDURE — 99214 OFFICE O/P EST MOD 30 MIN: CPT

## 2023-08-09 PROCEDURE — 93000 ELECTROCARDIOGRAM COMPLETE: CPT

## 2023-08-09 NOTE — REASON FOR VISIT
[Symptom and Test Evaluation] : symptom and test evaluation [Hypertension] : hypertension [FreeTextEntry1] : BP elevated here, good at home. Generally doing well, no CP or dyspnea. Not exercising much.   1. We have reviewed current AHA exercise guidelines, including 30 minutes 5 days per week of moderate level aerobic activity and 20 minutes twice per week of strength training. 2. The patient will keep a log of blood pressures at home, per protocol. We will review the log during the next visit.

## 2023-08-09 NOTE — ASSESSMENT
[FreeTextEntry1] : 1. We have reviewed current AHA exercise guidelines, including 30 minutes 5 days per week of moderate level aerobic activity and 20 minutes twice per week of strength training. 2. The patient will keep a log of blood pressures at home, per protocol. We will review the log during the next visit.

## 2023-08-17 LAB
ALBUMIN SERPL ELPH-MCNC: 4.4 G/DL
ALP BLD-CCNC: 65 U/L
ALT SERPL-CCNC: 14 U/L
ANION GAP SERPL CALC-SCNC: 10 MMOL/L
AST SERPL-CCNC: 15 U/L
BILIRUB SERPL-MCNC: 0.3 MG/DL
BUN SERPL-MCNC: 29 MG/DL
CALCIUM SERPL-MCNC: 9.7 MG/DL
CHLORIDE SERPL-SCNC: 106 MMOL/L
CO2 SERPL-SCNC: 25 MMOL/L
CREAT SERPL-MCNC: 0.82 MG/DL
EGFR: 79 ML/MIN/1.73M2
GLUCOSE SERPL-MCNC: 94 MG/DL
POTASSIUM SERPL-SCNC: 5.8 MMOL/L
PROT SERPL-MCNC: 6.7 G/DL
SODIUM SERPL-SCNC: 141 MMOL/L

## 2023-08-22 ENCOUNTER — APPOINTMENT (OUTPATIENT)
Dept: ULTRASOUND IMAGING | Facility: CLINIC | Age: 63
End: 2023-08-22
Payer: MEDICARE

## 2023-08-22 PROCEDURE — 76536 US EXAM OF HEAD AND NECK: CPT

## 2023-08-24 ENCOUNTER — APPOINTMENT (OUTPATIENT)
Dept: GASTROENTEROLOGY | Facility: CLINIC | Age: 63
End: 2023-08-24
Payer: COMMERCIAL

## 2023-08-24 VITALS
WEIGHT: 174 LBS | OXYGEN SATURATION: 98 % | HEIGHT: 63 IN | TEMPERATURE: 97.4 F | SYSTOLIC BLOOD PRESSURE: 131 MMHG | BODY MASS INDEX: 30.83 KG/M2 | DIASTOLIC BLOOD PRESSURE: 82 MMHG | HEART RATE: 76 BPM

## 2023-08-24 DIAGNOSIS — Z12.11 ENCOUNTER FOR SCREENING FOR MALIGNANT NEOPLASM OF COLON: ICD-10-CM

## 2023-08-24 PROCEDURE — 99203 OFFICE O/P NEW LOW 30 MIN: CPT

## 2023-08-24 NOTE — ADDENDUM
[FreeTextEntry1] : Attending addendum Patient is aware of typical guideline indications for average risk screening intervals of 10 years  indications risks benefits and alternatives to colonoscopy as a tool for colon cancer screening and prevention purposes were reviewed.  Patient is agreeable.

## 2023-08-24 NOTE — ASSESSMENT
[FreeTextEntry1] : 63F hx HTN, allergic rhinitis, thrombocytosis (likely due to myeloproliferative neoplasm, on Hydroxyurea), cholecystectomy, colon polyps. Here in GI clinic as NPA for colonoscopy consult, last seen 5/2018.

## 2023-08-24 NOTE — HISTORY OF PRESENT ILLNESS
[FreeTextEntry1] : 63F hx HTN, allergic rhinitis, thrombocytosis (likely due to myeloproliferative neoplasm, on Hydroxyurea), cholecystectomy, colon polyps. Here in GI clinic as NPA for colonoscopy consult, last seen 5/2018.  -Regular bowel movements daily without straining. No blood or dark stools. -Patient denies abdominal pain, nausea/vomiting, diarrhea/constipation. -No difficulties or pain swallowing, indigestion, bloating. - on cholesevelam since 12/2022 due to loose stools a/w cholecystectomy - on atrantil for bloating  -Last colonoscopy:2013 with polyps; colonoscopy 7/2018 with mild diverticulosis in sigmoid colon, noted internal hemorrhoids, recommended rpt colon in 10 years (due 7/2028) -Significant colonoscopy findings and pathology: as above -No prior EGD.  -Meds: currently on ASA. Hydroxyurea for thrombocytopenia/myeloproliferative disease, cholesevelam -No family history of colon or stomach cancer. Hx pancreatic cancer in mom.

## 2023-08-24 NOTE — PHYSICAL EXAM
[Healthy Appearing] : healthy appearing [No Acute Distress] : no acute distress [No Respiratory Distress] : no respiratory distress [Respiration, Rhythm And Depth] : normal respiratory rhythm and effort [Heart Rate And Rhythm] : heart rate was normal and rhythm regular [Normal S1, S2] : normal S1 and S2 [Abdomen Tenderness] : non-tender [No Masses] : no abdominal mass palpated [Abdomen Soft] : soft [] : no hepatosplenomegaly

## 2023-08-24 NOTE — REVIEW OF SYSTEMS
[Fever] : no fever [Chills] : no chills [Chest Pain] : no chest pain [Palpitations] : no palpitations [Shortness Of Breath] : no shortness of breath [Abdominal Pain] : no abdominal pain [Vomiting] : no vomiting [Constipation] : no constipation [Diarrhea] : no diarrhea [Heartburn] : no heartburn [Melena (black stool)] : no melena

## 2023-09-08 ENCOUNTER — APPOINTMENT (OUTPATIENT)
Dept: HEMATOLOGY ONCOLOGY | Facility: CLINIC | Age: 63
End: 2023-09-08

## 2023-09-21 ENCOUNTER — APPOINTMENT (OUTPATIENT)
Dept: INTERNAL MEDICINE | Facility: CLINIC | Age: 63
End: 2023-09-21
Payer: MEDICARE

## 2023-09-21 VITALS
DIASTOLIC BLOOD PRESSURE: 82 MMHG | TEMPERATURE: 98.5 F | HEART RATE: 87 BPM | RESPIRATION RATE: 16 BRPM | SYSTOLIC BLOOD PRESSURE: 114 MMHG | HEIGHT: 63 IN | BODY MASS INDEX: 31.01 KG/M2 | OXYGEN SATURATION: 98 % | WEIGHT: 175 LBS

## 2023-09-21 DIAGNOSIS — D47.1 CHRONIC MYELOPROLIFERATIVE DISEASE: ICD-10-CM

## 2023-09-21 DIAGNOSIS — R91.1 SOLITARY PULMONARY NODULE: ICD-10-CM

## 2023-09-21 PROCEDURE — 90662 IIV NO PRSV INCREASED AG IM: CPT

## 2023-09-21 PROCEDURE — G0008: CPT

## 2023-09-21 PROCEDURE — 99214 OFFICE O/P EST MOD 30 MIN: CPT | Mod: 25

## 2023-10-05 ENCOUNTER — APPOINTMENT (OUTPATIENT)
Dept: SURGERY | Facility: CLINIC | Age: 63
End: 2023-10-05
Payer: MEDICARE

## 2023-10-05 ENCOUNTER — LABORATORY RESULT (OUTPATIENT)
Age: 63
End: 2023-10-05

## 2023-10-05 VITALS
WEIGHT: 175 LBS | OXYGEN SATURATION: 100 % | BODY MASS INDEX: 29.88 KG/M2 | HEART RATE: 75 BPM | HEIGHT: 64 IN | SYSTOLIC BLOOD PRESSURE: 130 MMHG | DIASTOLIC BLOOD PRESSURE: 84 MMHG

## 2023-10-05 DIAGNOSIS — E04.1 NONTOXIC SINGLE THYROID NODULE: ICD-10-CM

## 2023-10-05 PROCEDURE — 99204 OFFICE O/P NEW MOD 45 MIN: CPT

## 2023-10-05 PROCEDURE — 10005 FNA BX W/US GDN 1ST LES: CPT

## 2023-10-09 ENCOUNTER — APPOINTMENT (OUTPATIENT)
Dept: HEMATOLOGY ONCOLOGY | Facility: CLINIC | Age: 63
End: 2023-10-09

## 2023-10-09 ENCOUNTER — NON-APPOINTMENT (OUTPATIENT)
Age: 63
End: 2023-10-09

## 2023-10-17 ENCOUNTER — RX RENEWAL (OUTPATIENT)
Age: 63
End: 2023-10-17

## 2023-10-25 ENCOUNTER — APPOINTMENT (OUTPATIENT)
Dept: GASTROENTEROLOGY | Facility: AMBULATORY MEDICAL SERVICES | Age: 63
End: 2023-10-25
Payer: MEDICARE

## 2023-10-25 PROCEDURE — 45378 DIAGNOSTIC COLONOSCOPY: CPT | Mod: PT

## 2023-11-08 ENCOUNTER — NON-APPOINTMENT (OUTPATIENT)
Age: 63
End: 2023-11-08

## 2023-11-08 ENCOUNTER — APPOINTMENT (OUTPATIENT)
Dept: CARDIOLOGY | Facility: CLINIC | Age: 63
End: 2023-11-08
Payer: MEDICARE

## 2023-11-08 VITALS
DIASTOLIC BLOOD PRESSURE: 86 MMHG | HEIGHT: 64 IN | SYSTOLIC BLOOD PRESSURE: 149 MMHG | OXYGEN SATURATION: 100 % | HEART RATE: 88 BPM | BODY MASS INDEX: 29.88 KG/M2 | WEIGHT: 175 LBS

## 2023-11-08 DIAGNOSIS — E78.00 PURE HYPERCHOLESTEROLEMIA, UNSPECIFIED: ICD-10-CM

## 2023-11-08 PROCEDURE — 99214 OFFICE O/P EST MOD 30 MIN: CPT

## 2023-11-08 PROCEDURE — 93000 ELECTROCARDIOGRAM COMPLETE: CPT

## 2023-12-21 ENCOUNTER — APPOINTMENT (OUTPATIENT)
Dept: ORTHOPEDIC SURGERY | Facility: CLINIC | Age: 63
End: 2023-12-21
Payer: MEDICARE

## 2023-12-21 VITALS — WEIGHT: 175 LBS | BODY MASS INDEX: 29.88 KG/M2 | HEIGHT: 64 IN

## 2023-12-21 DIAGNOSIS — S39.012A STRAIN OF MUSCLE, FASCIA AND TENDON OF LOWER BACK, INITIAL ENCOUNTER: ICD-10-CM

## 2023-12-21 DIAGNOSIS — I10 ESSENTIAL (PRIMARY) HYPERTENSION: ICD-10-CM

## 2023-12-21 DIAGNOSIS — Z86.03 PERSONAL HISTORY OF NEOPLASM OF UNCERTAIN BEHAVIOR: ICD-10-CM

## 2023-12-21 PROCEDURE — 72100 X-RAY EXAM L-S SPINE 2/3 VWS: CPT

## 2023-12-21 PROCEDURE — 99213 OFFICE O/P EST LOW 20 MIN: CPT

## 2023-12-21 PROCEDURE — 72070 X-RAY EXAM THORAC SPINE 2VWS: CPT

## 2023-12-21 PROCEDURE — 72170 X-RAY EXAM OF PELVIS: CPT

## 2023-12-21 NOTE — ASSESSMENT
[FreeTextEntry1] : XR reviewed, no compression fx recommend HEP, nsaids, moist heat fu 2 weeks spine MD consider MRI if not improved

## 2023-12-21 NOTE — IMAGING
[de-identified] : Back / Spine: Inspection: No erythema and ecchymosis. Palpation: b/l lumbar paraspinal tenderness.  Range of motion:  Near full range of motion but with mild stiffness.  Strength Testing: motor exam is 5/5 throughout both lower extremities with normal tone Neurological testing: light touch is intact throughout both lower extremities Straight Leg Raise: neg [place] test: neg bilaterally

## 2023-12-21 NOTE — HISTORY OF PRESENT ILLNESS
[9] : 9 [4] : 4 [de-identified] : 63F here with lower back pain x 2 weeks. She fell off a rolling chair. She was improving but pain returned yesterday. No numbness/tingling.  [] : no [FreeTextEntry5] : pt went to sit on a chair 12/7/23, chair rolled from under her and she hit her left sided mid back on it. bending down hurts, stretching and lifting causes pain. sometimes left sided lower back pain, depending on movement.

## 2024-01-10 ENCOUNTER — APPOINTMENT (OUTPATIENT)
Dept: ORTHOPEDIC SURGERY | Facility: CLINIC | Age: 64
End: 2024-01-10

## 2024-01-26 RX ORDER — COLESEVELAM HYDROCHLORIDE 625 MG/1
625 TABLET, COATED ORAL
Qty: 180 | Refills: 0 | Status: ACTIVE | COMMUNITY
Start: 2023-01-11 | End: 1900-01-01

## 2024-01-31 ENCOUNTER — NON-APPOINTMENT (OUTPATIENT)
Age: 64
End: 2024-01-31

## 2024-02-08 ENCOUNTER — LABORATORY RESULT (OUTPATIENT)
Age: 64
End: 2024-02-08

## 2024-02-08 ENCOUNTER — APPOINTMENT (OUTPATIENT)
Dept: INTERNAL MEDICINE | Facility: CLINIC | Age: 64
End: 2024-02-08
Payer: MEDICARE

## 2024-02-08 VITALS
WEIGHT: 176 LBS | SYSTOLIC BLOOD PRESSURE: 128 MMHG | BODY MASS INDEX: 30.05 KG/M2 | HEART RATE: 84 BPM | OXYGEN SATURATION: 98 % | HEIGHT: 64 IN | RESPIRATION RATE: 14 BRPM | DIASTOLIC BLOOD PRESSURE: 76 MMHG

## 2024-02-08 DIAGNOSIS — D47.3 ESSENTIAL (HEMORRHAGIC) THROMBOCYTHEMIA: ICD-10-CM

## 2024-02-08 DIAGNOSIS — Z00.00 ENCOUNTER FOR GENERAL ADULT MEDICAL EXAMINATION W/OUT ABNORMAL FINDINGS: ICD-10-CM

## 2024-02-08 DIAGNOSIS — N13.30 UNSPECIFIED HYDRONEPHROSIS: ICD-10-CM

## 2024-02-08 PROCEDURE — G0439: CPT

## 2024-02-08 PROCEDURE — 90677 PCV20 VACCINE IM: CPT

## 2024-02-08 PROCEDURE — G0402 INITIAL PREVENTIVE EXAM: CPT

## 2024-02-08 PROCEDURE — G0009: CPT

## 2024-02-08 NOTE — HEALTH RISK ASSESSMENT
[Yes] : Yes [2 - 4 times a month (2 pts)] : 2-4 times a month (2 points) [1 or 2 (0 pts)] : 1 or 2 (0 points) [Never (0 pts)] : Never (0 points) [Little interest or pleasure doing things] : 1) Little interest or pleasure doing things [Feeling down, depressed, or hopeless] : 2) Feeling down, depressed, or hopeless [0] : 2) Feeling down, depressed, or hopeless: Not at all (0) [PHQ-2 Negative - No further assessment needed] : PHQ-2 Negative - No further assessment needed [Patient reported mammogram was normal] : Patient reported mammogram was normal [Patient reported bone density results were normal] : Patient reported bone density results were normal [Patient reported colonoscopy was normal] : Patient reported colonoscopy was normal [Fully functional (bathing, dressing, toileting, transferring, walking, feeding)] : Fully functional (bathing, dressing, toileting, transferring, walking, feeding) [Fully functional (using the telephone, shopping, preparing meals, housekeeping, doing laundry, using] : Fully functional and needs no help or supervision to perform IADLs (using the telephone, shopping, preparing meals, housekeeping, doing laundry, using transportation, managing medications and managing finances) [Never] : Never [[id number]] : 0 [MammogramDate] : 07/23 [BoneDensityDate] : 07/23 [ColonoscopyDate] : 09/23

## 2024-02-08 NOTE — ASSESSMENT
[FreeTextEntry1] : myeloproliferative disorder she is on hydroxyurea to control plts had abdominal US for spleen monitoring and incidental finding of mild hydronephrosis on left will send to urologist

## 2024-02-09 LAB
25(OH)D3 SERPL-MCNC: 56.6 NG/ML
ALBUMIN SERPL ELPH-MCNC: 4.6 G/DL
ALP BLD-CCNC: 72 U/L
ALT SERPL-CCNC: 23 U/L
ANION GAP SERPL CALC-SCNC: 15 MMOL/L
APPEARANCE: CLEAR
AST SERPL-CCNC: 22 U/L
BACTERIA: NEGATIVE /HPF
BASOPHILS # BLD AUTO: 0.02 K/UL
BASOPHILS NFR BLD AUTO: 0.3 %
BILIRUB SERPL-MCNC: 0.4 MG/DL
BILIRUBIN URINE: NEGATIVE
BLOOD URINE: NEGATIVE
BUN SERPL-MCNC: 17 MG/DL
CALCIUM SERPL-MCNC: 9.6 MG/DL
CAST: 0 /LPF
CHLORIDE SERPL-SCNC: 106 MMOL/L
CHOLEST SERPL-MCNC: 227 MG/DL
CO2 SERPL-SCNC: 23 MMOL/L
COLOR: YELLOW
CREAT SERPL-MCNC: 0.71 MG/DL
EGFR: 94 ML/MIN/1.73M2
EOSINOPHIL # BLD AUTO: 0.11 K/UL
EOSINOPHIL NFR BLD AUTO: 1.8 %
EPITHELIAL CELLS: 1 /HPF
ESTIMATED AVERAGE GLUCOSE: 105 MG/DL
FOLATE SERPL-MCNC: >20 NG/ML
GLUCOSE QUALITATIVE U: NEGATIVE MG/DL
GLUCOSE SERPL-MCNC: 104 MG/DL
HBA1C MFR BLD HPLC: 5.3 %
HCT VFR BLD CALC: 40.3 %
HDLC SERPL-MCNC: 102 MG/DL
HGB BLD-MCNC: 13.3 G/DL
IMM GRANULOCYTES NFR BLD AUTO: 0.2 %
KETONES URINE: NEGATIVE MG/DL
LDLC SERPL CALC-MCNC: 110 MG/DL
LEUKOCYTE ESTERASE URINE: NEGATIVE
LYMPHOCYTES # BLD AUTO: 2.42 K/UL
LYMPHOCYTES NFR BLD AUTO: 38.9 %
MAN DIFF?: NORMAL
MCHC RBC-ENTMCNC: 32 PG
MCHC RBC-ENTMCNC: 33 GM/DL
MCV RBC AUTO: 96.9 FL
MICROSCOPIC-UA: NORMAL
MONOCYTES # BLD AUTO: 0.61 K/UL
MONOCYTES NFR BLD AUTO: 9.8 %
NEUTROPHILS # BLD AUTO: 3.05 K/UL
NEUTROPHILS NFR BLD AUTO: 49 %
NITRITE URINE: NEGATIVE
NONHDLC SERPL-MCNC: 125 MG/DL
PH URINE: 6
PLATELET # BLD AUTO: 426 K/UL
POTASSIUM SERPL-SCNC: 5.4 MMOL/L
PROT SERPL-MCNC: 7.4 G/DL
PROTEIN URINE: NEGATIVE MG/DL
RBC # BLD: 4.16 M/UL
RBC # FLD: 15 %
RED BLOOD CELLS URINE: 1 /HPF
SODIUM SERPL-SCNC: 143 MMOL/L
SPECIFIC GRAVITY URINE: 1.01
TRIGL SERPL-MCNC: 86 MG/DL
TSH SERPL-ACNC: 4.76 UIU/ML
URATE SERPL-MCNC: 5.5 MG/DL
UROBILINOGEN URINE: 0.2 MG/DL
VIT B12 SERPL-MCNC: 1036 PG/ML
WBC # FLD AUTO: 6.22 K/UL
WHITE BLOOD CELLS URINE: 0 /HPF

## 2024-02-14 ENCOUNTER — NON-APPOINTMENT (OUTPATIENT)
Age: 64
End: 2024-02-14

## 2024-02-14 ENCOUNTER — APPOINTMENT (OUTPATIENT)
Dept: CARDIOLOGY | Facility: CLINIC | Age: 64
End: 2024-02-14
Payer: MEDICARE

## 2024-02-14 VITALS
HEART RATE: 78 BPM | OXYGEN SATURATION: 100 % | HEIGHT: 64 IN | BODY MASS INDEX: 30.05 KG/M2 | SYSTOLIC BLOOD PRESSURE: 146 MMHG | WEIGHT: 176 LBS | DIASTOLIC BLOOD PRESSURE: 85 MMHG

## 2024-02-14 PROCEDURE — G2211 COMPLEX E/M VISIT ADD ON: CPT

## 2024-02-14 PROCEDURE — 93000 ELECTROCARDIOGRAM COMPLETE: CPT

## 2024-02-14 PROCEDURE — 99214 OFFICE O/P EST MOD 30 MIN: CPT

## 2024-02-14 NOTE — REASON FOR VISIT
[Symptom and Test Evaluation] : symptom and test evaluation [Hypertension] : hypertension [FreeTextEntry1] : Generally doing well, no CP or dyspnea. Had COVID, now better. BP log with elevated DBP >80  1. HTN. Blood pressure remains elevated, even upon reevaluation. Continue to keep log for 3m. If remains elevated, increase amlodipine to 5 mg daily. 2. I personally reviewed the patient's outpatient records from the primary physician. , A1C 5.3%.  Need for statin. LDL adequately controlled. HDL >100/  f/u 3m  I spent 35 minutes on Mrs. Robles's care, including face to face time, review of previous records, and documentation.

## 2024-02-22 ENCOUNTER — APPOINTMENT (OUTPATIENT)
Dept: ULTRASOUND IMAGING | Facility: CLINIC | Age: 64
End: 2024-02-22
Payer: MEDICARE

## 2024-02-22 PROCEDURE — 76536 US EXAM OF HEAD AND NECK: CPT

## 2024-02-23 ENCOUNTER — NON-APPOINTMENT (OUTPATIENT)
Age: 64
End: 2024-02-23

## 2024-02-24 ENCOUNTER — NON-APPOINTMENT (OUTPATIENT)
Age: 64
End: 2024-02-24

## 2024-02-26 ENCOUNTER — APPOINTMENT (OUTPATIENT)
Dept: UROLOGY | Facility: CLINIC | Age: 64
End: 2024-02-26
Payer: MEDICARE

## 2024-02-26 VITALS
HEIGHT: 64 IN | OXYGEN SATURATION: 98 % | HEART RATE: 76 BPM | RESPIRATION RATE: 16 BRPM | BODY MASS INDEX: 29.88 KG/M2 | WEIGHT: 175 LBS | SYSTOLIC BLOOD PRESSURE: 122 MMHG | DIASTOLIC BLOOD PRESSURE: 80 MMHG

## 2024-02-26 DIAGNOSIS — N28.1 CYST OF KIDNEY, ACQUIRED: ICD-10-CM

## 2024-02-26 DIAGNOSIS — N13.30 UNSPECIFIED HYDRONEPHROSIS: ICD-10-CM

## 2024-02-26 DIAGNOSIS — D47.3 ESSENTIAL (HEMORRHAGIC) THROMBOCYTHEMIA: ICD-10-CM

## 2024-02-26 PROCEDURE — 99203 OFFICE O/P NEW LOW 30 MIN: CPT

## 2024-02-26 RX ORDER — SODIUM PICOSULFATE, MAGNESIUM OXIDE, AND ANHYDROUS CITRIC ACID 10; 3.5; 12 MG/160ML; G/160ML; G/160ML
10-3.5-12 MG-GM LIQUID ORAL
Qty: 1 | Refills: 0 | Status: COMPLETED | COMMUNITY
Start: 2023-08-24 | End: 2024-02-26

## 2024-02-26 RX ORDER — SODIUM SULFATE, POTASSIUM SULFATE AND MAGNESIUM SULFATE 1.6; 3.13; 17.5 G/177ML; G/177ML; G/177ML
17.5-3.13-1.6 SOLUTION ORAL
Qty: 1 | Refills: 0 | Status: COMPLETED | COMMUNITY
Start: 2023-08-24 | End: 2024-02-26

## 2024-02-26 NOTE — PHYSICAL EXAM
[Normal Appearance] : normal appearance [Well Groomed] : well groomed [General Appearance - In No Acute Distress] : no acute distress [Edema] : no peripheral edema [Respiration, Rhythm And Depth] : normal respiratory rhythm and effort [Abdomen Soft] : soft [Exaggerated Use Of Accessory Muscles For Inspiration] : no accessory muscle use [Costovertebral Angle Tenderness] : no ~M costovertebral angle tenderness [Abdomen Tenderness] : non-tender [Normal Station and Gait] : the gait and station were normal for the patient's age [] : no rash [No Focal Deficits] : no focal deficits [Oriented To Time, Place, And Person] : oriented to person, place, and time [Affect] : the affect was normal [Mood] : the mood was normal

## 2024-02-26 NOTE — REVIEW OF SYSTEMS
[see HPI] : see HPI [Negative] : Heme/Lymph [Dry Eyes] : dryness of the eyes [Palpitations] : palpitations [Limb Swelling] : limb swelling [Dizziness] : dizziness

## 2024-02-26 NOTE — ASSESSMENT
[FreeTextEntry1] : 63-year-old female comes for consultation of left parapelvic cyst versus hydronephrosis, identified incidentally September 2023 during abdominal ultrasound.  Discussed with patient the need to obtain CT urogram to tell part hydronephrosis from parapyelic cyst and determine enhancement or cause of hydronephrosis.  Patient amenable. [Hydronephrosis (591\N13.30)] : Salter-Younger type I cooperative. HEENT: Pupils equal, round, and reactive to light. Conjunctivae/corneas clear. Neck: Supple, with full range of motion. No jugular venous distention. Respiratory:  Normal respiratory effort. Clear to auscultation, bilaterally without Rales/Wheezes/Rhonchi. Cardiovascular: Regular rate and rhythm with normal S1/S2 without murmurs, rubs or gallops. Abdomen: Soft, mildly tender, non-distended with normal bowel sounds. Musculoskeletal: No clubbing, cyanosis or edema bilaterally. Skin: Skin color, texture, turgor normal.  No rashes or lesions. Neurologic:  Neurovascularly intact without any focal sensory/motor deficits. Cranial nerves: II-XII intact, grossly non-focal.  Psychiatric: Alert and oriented, thought content appropriate, normal insight        Labs:   Recent Labs     07/15/20  0448 07/16/20  0500 07/17/20  0450   WBC 21.3* 15.5* 12.8*   HGB 11.9* 12.4* 11.4*   HCT 36.3* 37.7* 34.4*    304 291     Recent Labs     07/15/20  0448 07/16/20  0500 07/17/20  0450    144 141   K 3.8 3.6 3.5   * 109 106   CO2 22 24 25   BUN 4* 6* 6*   CREATININE 0.6* 0.7* 0.7*   CALCIUM 9.4 9.7 9.1   PHOS 3.0 2.7 3.2     Recent Labs     07/15/20  0448 07/16/20  0500 07/17/20  0450   AST 14* 13* 21   ALT 19 16 21   BILITOT 0.7 0.8 0.6   ALKPHOS 157* 138* 150*     Recent Labs     07/15/20  0448 07/16/20  0500 07/17/20  0450   INR 1.20* 1.31* 1.32*     No results for input(s): CKTOTAL, TROPONINI in the last 72 hours. Urinalysis:      Lab Results   Component Value Date    NITRU Negative 07/11/2020    WBCUA >100 07/11/2020    BACTERIA 4+ 07/11/2020    RBCUA see below 07/11/2020    BLOODU SMALL 07/11/2020    SPECGRAV 1.015 07/11/2020    GLUCOSEU 250 07/11/2020    GLUCOSEU NEGATIVE 10/13/2010       Radiology:  CT PELVIS WO CONTRAST Additional Contrast? None   Final Result   1. Stapleton catheter in place.   The proximal portion of the balloon may be   inflated within the proximal portion of the prostatic urethra. This could be   repositioned to see if this improves the patient's pain. 2. The entire scrotum is not included on the imaging. There is no evidence   of peritoneal necrotizing infection or abscess. 3. Stable mild rectosigmoid wall thickening which may relate to edema or   acute inflammation. No obstruction, perforation, or abscess. US SCROTUM AND TESTICLES   Final Result   1. Extensive subcutaneous edema and skin thickening involving the scrotum. Questionable shadowing is noted on a couple of the images of the left scrotal   sac and gas cannot be excluded. Recommend dedicated CT of the pelvis for   further evaluation. 2. There is probable normal Doppler flow within left testis but difficult to   appreciate on the images provided due to positioning of the left testis and   overlying edema. If there is concern for torsion recommend a follow-up   ultrasound if and when acute issues arise. 3. Small right hydrocele with a moderate complex left hydrocele with   septations. Critical results were called by Dr. Jimbo Carnes. Sravanthi Alvarado MD to Dr. Mark Vitale on   7/12/2020 at 22:11. XR CHEST PORTABLE   Final Result   Nodular density within the lower right lung, potentially focal airspace   disease, pulmonary nodule, or artifact related to nipple shadow. Chest CT   could be performed for further characterization. CT CHEST ABDOMEN PELVIS W CONTRAST   Final Result   1. Stapleton catheter bulb is seen in the region of the prostate. As the patient   is status post recent TURP, this is of uncertain clinical significance. Clinical correlation is recommended with repositioning as indicated. 2. Circumferential bladder wall thickening. This may be related to   nondistention, inflammation, bacterial cystitis or an infiltrative process. Suggest correlation with urinalysis. 3. No acute intrathoracic findings. 4. Mild cardiomegaly and coronary artery disease.          CT HEAD WO CONTRAST Final Result   Overall exam is limited due to significant motion artifact. There is a   subtle 22 x 11 mm area of gray-white matter differentiation in the left   frontal parietal region raising suspicion for an age-indeterminate infarct. No prior exams are available for comparison. If the patient is able to lie   still, a repeat CT of the head should be considered. Results were called by Dr. Chapis Garvin. Myron Caceres MD to Kelli Ulloa on 7/11/2020   at 19:51. Assessment/Plan:    Active Hospital Problems    Diagnosis    Constipation due to opioid therapy [K59.03, T40.2X5A]    Sepsis ([facility] [address] [A41.9]    Acute epididymo-orchitis [N45.3]    Bipolar 1 disorder ([facility] [address] [F31.9]    Asthma [J45.909]    Pyelonephritis [N12]    S/P TURP [Z81.80]    Metabolic encephalopathy [O22.87]    Type 2 diabetes mellitus ([facility] [address] [E11.9]    Essential hypertension [I10]    Prostate hyperplasia with urinary obstruction [N40.1, N13.8]     Sepsis - 2/2 orchitis, p/w leukocytosis, high fever, now improving. Cultures positive for enterococcus. Continue with ampicillin abx s 7/12, d/c on amoxicillin for total 10 d course. Metabolic encephalopathy - 2/2 the above in setting of baseline bipolar disorder, improving. Monitor     Weakness- PT/OT consulted- recommended acute rehab declined by insurance.  arranging for SNF. HTN -controlled. Continue BP meds    dm2 - controlled, continue basal insulin continue ssi     Bipolar disorder - continue lithium         DVT Prophylaxis: lovenox  Diet: DIET CARB CONTROL;  Code Status: Full Code    PT/OT Eval Status: consulted, rec IP rehab, denied by insurance     Dispo -  Awaiting COVID test result for SNF placement arrangement.      Masoud Braga MD

## 2024-02-26 NOTE — HISTORY OF PRESENT ILLNESS
[FreeTextEntry1] : 63-year-old female comes for consultation of left parapelvic cyst versus hydronephrosis, identified incidentally September 2023 during abdominal ultrasound. No history of kidney stones. Denies LUTS, fevers, chills, hematuria, flank pain. No history of chemotherapy or contact with aromatic chemicals. Has never smoked. No family history of Prostate cancer or [place] related cancers.

## 2024-02-26 NOTE — SIGNATURES
[TextEntry] : Sulaiman Abbott M.D. Minimally Invasive and Robotic Urologic Surgery [facility] | [facility]  of Urology [employer] [facility] Tel: [phone] | Fax: [phone] | email: [email or web address]

## 2024-02-27 ENCOUNTER — APPOINTMENT (OUTPATIENT)
Dept: CT IMAGING | Facility: CLINIC | Age: 64
End: 2024-02-27
Payer: MEDICARE

## 2024-02-27 PROCEDURE — 74178 CT ABD&PLV WO CNTR FLWD CNTR: CPT

## 2024-03-07 ENCOUNTER — APPOINTMENT (OUTPATIENT)
Dept: SURGERY | Facility: CLINIC | Age: 64
End: 2024-03-07
Payer: MEDICARE

## 2024-03-07 VITALS
SYSTOLIC BLOOD PRESSURE: 139 MMHG | HEIGHT: 64 IN | DIASTOLIC BLOOD PRESSURE: 82 MMHG | BODY MASS INDEX: 29.88 KG/M2 | HEART RATE: 73 BPM | WEIGHT: 175 LBS | OXYGEN SATURATION: 100 %

## 2024-03-07 LAB
25(OH)D3 SERPL-MCNC: 58.7 NG/ML
APTT BLD: 34.2 SEC
CALCIUM SERPL-MCNC: 9.7 MG/DL
INR PPP: 0.95 RATIO
PARATHYROID HORMONE INTACT: 29 PG/ML
PT BLD: 10.8 SEC
T3 SERPL-MCNC: 123 NG/DL
T4 FREE SERPL-MCNC: 1.2 NG/DL
T4 SERPL-MCNC: 7.9 UG/DL
TSH SERPL-ACNC: 4.85 UIU/ML

## 2024-03-07 PROCEDURE — 99214 OFFICE O/P EST MOD 30 MIN: CPT

## 2024-03-07 NOTE — HISTORY OF PRESENT ILLNESS
[de-identified] : Mrs. Robles presents for follow-up.  Her recent (approximately six-month follow-up) neck ultrasound, performed 02/22/2024 ([facility]), reported a right mid to lower 1.0 cm colloid cyst, "scattered 3 to 4 mm spongiform like nodules" on the right, and a left mid 2.6 cm complex thyroid nodule.  There was no lymphadenopathy appreciated.  When compared to her prior ultrasound, her bilateral thyroid nodules have remained stable.  She states that she feels well and continues to deny dysphagia.  Finally, she states that she was recently again noted to have abnormal thyroid labs.  Labs performed 02/08/2024 revealed a mildly elevated TSH = 4.76.

## 2024-03-07 NOTE — PHYSICAL EXAM
[Midline] : located in midline position [Normal] : orientation to person, place, and time: normal [de-identified] : The neck appears flat.  There is a small and relatively soft palpable left thyroid nodule. [de-identified] : Extremities: SMALL x 4.   Skin: No obvious skin lesions.   Voice: clear

## 2024-03-07 NOTE — ASSESSMENT
[FreeTextEntry1] : Assessment: 63-year-old woman, with history significant for an elevated TSH, with stable bilateral thyroid nodules.  Plan: - The recent follow-up neck ultrasound was reviewed with Mrs. Robles and her  and I explained that when compared to her prior ultrasound, her bilateral thyroid nodules have remained stable. - The indications for biopsy and thyroidectomy were then reviewed and I reiterated that a biopsy in general is recommended for solid thyroid nodules > 1 cm in diameter, complex thyroid nodules > 1.5 cm in diameter, and of spongiform-appearing nodules > 2 cm in diameter. Based upon these guidelines, I explained that the only nodule that meets CONG criteria for a biopsy is her dominant left thyroid nodule.  I then explained that her previous biopsy contained scant benign follicular cells, however was classified as non-diagnostic (Orange I) due to the limited number of cells.  I had therefore again offered to arrange for a repeat FNA biopsy of this nodule by radiology in order to have on-site cytology for immediate slide review for adequacy. - The patient expressed understanding of the above and opted to have a repeat biopsy.  She was provided with the name and contact information for Dr. Cosby. - Finally, will check labs today including a complete thyroid function panel and thyroid antibodies in light of her recently elevated TSH.  Will additionally check baseline coags and iPTH in light of her osteoporosis. -  She was instructed to follow-up 1-week after she has the biopsy to review all results and further management.

## 2024-03-08 LAB
THYROGLOB AB SERPL-ACNC: <20 IU/ML
THYROPEROXIDASE AB SERPL IA-ACNC: 56.5 IU/ML

## 2024-03-09 LAB — CA-I SERPL-SCNC: 5.2 MG/DL

## 2024-04-08 ENCOUNTER — NON-APPOINTMENT (OUTPATIENT)
Age: 64
End: 2024-04-08

## 2024-04-09 ENCOUNTER — RESULT REVIEW (OUTPATIENT)
Age: 64
End: 2024-04-09

## 2024-04-09 ENCOUNTER — APPOINTMENT (OUTPATIENT)
Dept: ULTRASOUND IMAGING | Facility: IMAGING CENTER | Age: 64
End: 2024-04-09
Payer: MEDICARE

## 2024-04-09 ENCOUNTER — OUTPATIENT (OUTPATIENT)
Dept: OUTPATIENT SERVICES | Facility: HOSPITAL | Age: 64
LOS: 1 days | End: 2024-04-09
Payer: MEDICARE

## 2024-04-09 DIAGNOSIS — E04.2 NONTOXIC MULTINODULAR GOITER: ICD-10-CM

## 2024-04-09 PROCEDURE — 88172 CYTP DX EVAL FNA 1ST EA SITE: CPT

## 2024-04-09 PROCEDURE — 10005 FNA BX W/US GDN 1ST LES: CPT

## 2024-04-09 PROCEDURE — 88173 CYTOPATH EVAL FNA REPORT: CPT

## 2024-04-09 PROCEDURE — 88173 CYTOPATH EVAL FNA REPORT: CPT | Mod: 26

## 2024-04-11 ENCOUNTER — NON-APPOINTMENT (OUTPATIENT)
Age: 64
End: 2024-04-11

## 2024-04-11 LAB — NON-GYNECOLOGICAL CYTOLOGY STUDY: SIGNIFICANT CHANGE UP

## 2024-04-22 LAB — THYROSEQ RESULT: SIGNIFICANT CHANGE UP

## 2024-04-25 ENCOUNTER — APPOINTMENT (OUTPATIENT)
Dept: SURGERY | Facility: CLINIC | Age: 64
End: 2024-04-25
Payer: MEDICARE

## 2024-04-25 VITALS
OXYGEN SATURATION: 100 % | DIASTOLIC BLOOD PRESSURE: 81 MMHG | WEIGHT: 175 LBS | HEART RATE: 72 BPM | BODY MASS INDEX: 29.88 KG/M2 | SYSTOLIC BLOOD PRESSURE: 132 MMHG | HEIGHT: 64 IN

## 2024-04-25 DIAGNOSIS — E06.3 AUTOIMMUNE THYROIDITIS: ICD-10-CM

## 2024-04-25 DIAGNOSIS — R79.89 OTHER SPECIFIED ABNORMAL FINDINGS OF BLOOD CHEMISTRY: ICD-10-CM

## 2024-04-25 PROCEDURE — 99214 OFFICE O/P EST MOD 30 MIN: CPT

## 2024-04-25 NOTE — HISTORY OF PRESENT ILLNESS
[de-identified] : Mrs. Robles presents for follow-up.  FNA biopsy of the left mid 2.6 cm complex thyroid nodule was felt to reveal atypia of undetermined significance (Kimball III), was positive for a TSHR mutation, an EZH1 mutation, and high expression of the NIS (SLC5A5) gene on ThyroSeq, and was reported to be suggestive of a benign and possibly hyperfunctioning nodule with only an approximately 3% risk of malignancy.  Labs performed 03/07/2024 revealed a persistently mildly elevated TSH = 4.85, T4 = 7.9, FT4 = 1.2, T3 = 123, an elevated TPO Ab = 56.5, TG Ab = < 20, serum calcium = 9.7, iPTH = 29, 25-OH vitamin D = 58.7, and normal coags.

## 2024-04-25 NOTE — PHYSICAL EXAM
[Midline] : located in midline position [Normal] : orientation to person, place, and time: normal [de-identified] : The neck appears flat.  There is a small and relatively soft palpable left thyroid nodule. [de-identified] : Extremities: SMALL x 4.   Skin: No obvious skin lesions.   Voice: clear

## 2024-04-25 NOTE — ASSESSMENT
[FreeTextEntry1] : Assessment: 63-year-old woman, with history significant for Hashimoto's thyroiditis and a mildly elevated TSH, with stable bilateral thyroid nodules including a presumably low-risk indeterminate left thyroid nodule with toxic potential.  Plan: - All interval labs and biopsy results were reviewed with Mrs. Robles and her .  Considering that she is not hyperthyroid and that her left thyroid nodule has remained stable and was reported to be low-risk of malignancy, I have recommended continued observation with a repeat ultrasound after 1-year (February, 2025).  Will plan to check repeat TFTs at that time. - The patient and her  expressed understanding of the above and agree with this plan.  She will continue to follow-up with her endocrinologist and was instructed to follow-up with me PRN or next year.  Will arrange for her to have the follow-up ultrasound and labs prior to her appointment such that we can review the results and further management at that time.

## 2024-05-16 ENCOUNTER — NON-APPOINTMENT (OUTPATIENT)
Age: 64
End: 2024-05-16

## 2024-05-16 ENCOUNTER — APPOINTMENT (OUTPATIENT)
Dept: CARDIOLOGY | Facility: CLINIC | Age: 64
End: 2024-05-16
Payer: MEDICARE

## 2024-05-16 VITALS
DIASTOLIC BLOOD PRESSURE: 87 MMHG | WEIGHT: 175 LBS | OXYGEN SATURATION: 100 % | HEART RATE: 74 BPM | SYSTOLIC BLOOD PRESSURE: 154 MMHG | BODY MASS INDEX: 29.88 KG/M2 | HEIGHT: 64 IN

## 2024-05-16 DIAGNOSIS — R03.0 ELEVATED BLOOD-PRESSURE READING, W/OUT DIAGNOSIS OF HYPERTENSION: ICD-10-CM

## 2024-05-16 PROCEDURE — 99213 OFFICE O/P EST LOW 20 MIN: CPT

## 2024-05-16 PROCEDURE — G2211 COMPLEX E/M VISIT ADD ON: CPT

## 2024-05-16 PROCEDURE — 93000 ELECTROCARDIOGRAM COMPLETE: CPT

## 2024-05-16 RX ORDER — AMLODIPINE BESYLATE 2.5 MG/1
2.5 TABLET ORAL DAILY
Qty: 90 | Refills: 3 | Status: ACTIVE | COMMUNITY
Start: 2022-11-16 | End: 1900-01-01

## 2024-05-16 NOTE — REASON FOR VISIT
Chief Complaint   Patient presents with   • Follow-up     2 week follow up           History of Present Illness    Terrie Cuello is a 70 year old with PMHx HLD, hyperparathyroidism, and chest wall pain who presents for a follow up visit.     The patient sustained a fall on 10/6/2019 resulting in an injury of her chest wall and a bruise of her right hip. is complaining of stomach pain and severe gas, which she has been managing with Gas-X tablets. She presented to [facility] with abdominal pain and rib pain where she refused to undergo a CT scan.  The patient is also concerned that her falling may be due to neurological issues    There are no further complaints.       Review:  Patient's medications, allergies, past medical, surgical, social and family histories were reviewed and updated as appropriate.    Review of Systems   Respiratory: Negative for shortness of breath.    Cardiovascular: Negative for chest pain.   Gastrointestinal: Positive for abdominal distention and abdominal pain.   All other systems reviewed and are negative.      Physical Exam  HENT:      Head: Normocephalic and atraumatic.      Right Ear: External ear normal.      Left Ear: External ear normal.   Eyes:      Pupils: Pupils are equal, round, and reactive to light.   Neck:      Musculoskeletal: Normal range of motion and neck supple.   Cardiovascular:      Rate and Rhythm: Normal rate and regular rhythm.      Heart sounds: Normal heart sounds.   Pulmonary:      Effort: Pulmonary effort is normal.   Abdominal:      General: Bowel sounds are normal.      Palpations: Abdomen is soft.      Tenderness: There is tenderness.   Musculoskeletal: Normal range of motion.   Skin:     General: Skin is warm and dry.   Neurological:      Mental Status: She is alert and oriented to person, place, and time.   Psychiatric:         Behavior: Behavior normal.         Thought Content: Thought content normal.         Assessment   Blunt trauma to  abdomen, subsequent encounter  - CT ABDOMEN PELVIS W WO CONTRAST; Future        PLAN:  1. Order for stat CT scan of abdomen and pelvis with and w/o contrast  Follow up in one week      Scribe Attestation: On 10/11/2019, INavdeep scribed the services personally performed by Dr. Adrian Berger.   Provider Attestation: The documentation recorded by the scribe accurately reflects the service I personally performed and the decisions made by me, Dr. Adrian Berger.         [Symptom and Test Evaluation] : symptom and test evaluation [Hypertension] : hypertension [FreeTextEntry1] : A little stressed, daughter is getting  in September, Bridal shower is early June and she is planning it. Exercising, though needs a little more cardio Home BP log reviewed with good BP control.  1. HTN. BP well controlled upon reevaluation. Continue current medication

## 2024-06-06 ENCOUNTER — APPOINTMENT (OUTPATIENT)
Dept: ENDOCRINOLOGY | Facility: CLINIC | Age: 64
End: 2024-06-06
Payer: MEDICARE

## 2024-06-06 VITALS
HEIGHT: 64 IN | OXYGEN SATURATION: 98 % | DIASTOLIC BLOOD PRESSURE: 82 MMHG | BODY MASS INDEX: 29.02 KG/M2 | WEIGHT: 170 LBS | HEART RATE: 87 BPM | SYSTOLIC BLOOD PRESSURE: 125 MMHG

## 2024-06-06 DIAGNOSIS — E03.8 OTHER SPECIFIED HYPOTHYROIDISM: ICD-10-CM

## 2024-06-06 DIAGNOSIS — E04.2 NONTOXIC MULTINODULAR GOITER: ICD-10-CM

## 2024-06-06 PROCEDURE — 99204 OFFICE O/P NEW MOD 45 MIN: CPT

## 2024-06-06 PROCEDURE — G2211 COMPLEX E/M VISIT ADD ON: CPT

## 2024-06-06 RX ORDER — UBIDECARENONE/VIT E ACET 100MG-5
CAPSULE ORAL
Refills: 0 | Status: ACTIVE | COMMUNITY

## 2024-06-06 NOTE — HISTORY OF PRESENT ILLNESS
[FreeTextEntry1] : Ms. JAIME MCDOWELL is a 64 year old female with a past medical history of hypertension, hyperlipidemia, hypothyroidism, thyroid nodules, osteoporosis, pulmonary nodules presents for evaluation of her thyroid.  Patient was evaluated by endocrine surgery.  He had biopsy of her nodule and was found to have [place] 3 with Thyroseq coming back low potential risk.  However, the nodule came back positive for a TSHR mutation, an EZH1 mutation, and high expression of the NIS (SLC5A5) gene on ThyroSeq, and was reported to be suggestive of a benign and possibly hyperfunctioning nodule with only an approximately 3% risk of malignancy.  She does have positive TPO antibodies her last TSH have been elevated.  Patient does not report any symptoms.

## 2024-06-06 NOTE — ASSESSMENT
[FreeTextEntry1] : 64 year old female with a past medical history of hypertension, hyperlipidemia, hypothyroidism, thyroid nodules, osteoporosis, pulmonary nodules presents for evaluation of her thyroid  1.  Multiple thyroid nodules Status post biopsy Explained thyroid nodules and the risk of cancer, FNA and management Low risk for malignancy Nodule did come back ThyroSeq positive for mutations that could potentially make it hyperthyroid but her levels and symptoms do not correlate currently Will continue to monitor Repeat ultrasound next February 2025  2.  Hypothyroidism Patient has on and off lower normal thyroid levels She has positive antibodies for Hashimoto's Subclinical Not recommending any LT4 at this time Will continue to monitor thyroid function

## 2024-07-25 ENCOUNTER — APPOINTMENT (OUTPATIENT)
Dept: MAMMOGRAPHY | Facility: CLINIC | Age: 64
End: 2024-07-25
Payer: MEDICARE

## 2024-07-25 ENCOUNTER — RESULT REVIEW (OUTPATIENT)
Age: 64
End: 2024-07-25

## 2024-07-25 PROCEDURE — 77067 SCR MAMMO BI INCL CAD: CPT

## 2024-07-25 PROCEDURE — 77063 BREAST TOMOSYNTHESIS BI: CPT

## 2024-09-24 ENCOUNTER — NON-APPOINTMENT (OUTPATIENT)
Age: 64
End: 2024-09-24

## 2024-09-25 ENCOUNTER — APPOINTMENT (OUTPATIENT)
Dept: CARDIOLOGY | Facility: CLINIC | Age: 64
End: 2024-09-25
Payer: MEDICARE

## 2024-09-25 ENCOUNTER — NON-APPOINTMENT (OUTPATIENT)
Age: 64
End: 2024-09-25

## 2024-09-25 VITALS
HEART RATE: 77 BPM | WEIGHT: 174 LBS | SYSTOLIC BLOOD PRESSURE: 129 MMHG | OXYGEN SATURATION: 100 % | BODY MASS INDEX: 29.71 KG/M2 | DIASTOLIC BLOOD PRESSURE: 82 MMHG | HEIGHT: 64 IN

## 2024-09-25 DIAGNOSIS — E78.00 PURE HYPERCHOLESTEROLEMIA, UNSPECIFIED: ICD-10-CM

## 2024-09-25 DIAGNOSIS — R03.0 ELEVATED BLOOD-PRESSURE READING, W/OUT DIAGNOSIS OF HYPERTENSION: ICD-10-CM

## 2024-09-25 PROCEDURE — G2211 COMPLEX E/M VISIT ADD ON: CPT

## 2024-09-25 PROCEDURE — 93000 ELECTROCARDIOGRAM COMPLETE: CPT

## 2024-09-25 PROCEDURE — 99214 OFFICE O/P EST MOD 30 MIN: CPT

## 2024-09-25 NOTE — REASON FOR VISIT
[Symptom and Test Evaluation] : symptom and test evaluation [Hypertension] : hypertension [FreeTextEntry1] : BP log with good SBP but borderline DBP Has not been exercising because of daughter's wedding  1. HTN. Blood pressure remains elevated, even upon reevaluation. Monitor clinically for now. Restart exercise. BP machine validated. f/u 6m

## 2024-09-25 NOTE — ASSESSMENT
[FreeTextEntry1] : 1. HTN. Blood pressure remains elevated, even upon reevaluation. Monitor clinically for now. Restart exercise. BP machine validated. f/u 6m

## 2024-12-02 ENCOUNTER — RX RENEWAL (OUTPATIENT)
Age: 64
End: 2024-12-02

## 2025-01-24 ENCOUNTER — APPOINTMENT (OUTPATIENT)
Dept: ULTRASOUND IMAGING | Facility: CLINIC | Age: 65
End: 2025-01-24

## 2025-01-24 PROCEDURE — 76536 US EXAM OF HEAD AND NECK: CPT

## 2025-02-19 ENCOUNTER — APPOINTMENT (OUTPATIENT)
Dept: INTERNAL MEDICINE | Facility: CLINIC | Age: 65
End: 2025-02-19
Payer: MEDICARE

## 2025-02-19 VITALS
HEIGHT: 64 IN | WEIGHT: 178 LBS | BODY MASS INDEX: 30.39 KG/M2 | TEMPERATURE: 97.9 F | DIASTOLIC BLOOD PRESSURE: 84 MMHG | RESPIRATION RATE: 15 BRPM | HEART RATE: 93 BPM | SYSTOLIC BLOOD PRESSURE: 150 MMHG | OXYGEN SATURATION: 97 %

## 2025-02-19 DIAGNOSIS — Z00.00 ENCOUNTER FOR GENERAL ADULT MEDICAL EXAMINATION W/OUT ABNORMAL FINDINGS: ICD-10-CM

## 2025-02-19 PROCEDURE — G0439: CPT

## 2025-02-20 LAB
25(OH)D3 SERPL-MCNC: 63.7 NG/ML
ALBUMIN SERPL ELPH-MCNC: 4.4 G/DL
ALP BLD-CCNC: 77 U/L
ALT SERPL-CCNC: 15 U/L
ANION GAP SERPL CALC-SCNC: 14 MMOL/L
APPEARANCE: CLEAR
AST SERPL-CCNC: 19 U/L
BACTERIA: NEGATIVE /HPF
BILIRUB SERPL-MCNC: 0.3 MG/DL
BILIRUBIN URINE: NEGATIVE
BLOOD URINE: NEGATIVE
BUN SERPL-MCNC: 20 MG/DL
CALCIUM SERPL-MCNC: 9.4 MG/DL
CAST: 0 /LPF
CHLORIDE SERPL-SCNC: 104 MMOL/L
CHOLEST SERPL-MCNC: 221 MG/DL
CO2 SERPL-SCNC: 23 MMOL/L
COLOR: YELLOW
CREAT SERPL-MCNC: 0.63 MG/DL
EGFR: 98 ML/MIN/1.73M2
EPITHELIAL CELLS: 1 /HPF
FOLATE SERPL-MCNC: >20 NG/ML
GLUCOSE QUALITATIVE U: NEGATIVE MG/DL
GLUCOSE SERPL-MCNC: 91 MG/DL
HDLC SERPL-MCNC: 104 MG/DL
KETONES URINE: NEGATIVE MG/DL
LDLC SERPL CALC-MCNC: 106 MG/DL
LEUKOCYTE ESTERASE URINE: NEGATIVE
MICROSCOPIC-UA: NORMAL
NITRITE URINE: NEGATIVE
NONHDLC SERPL-MCNC: 117 MG/DL
PH URINE: 6
POTASSIUM SERPL-SCNC: 4.6 MMOL/L
POTASSIUM SERPL-SCNC: 4.9 MMOL/L
PROT SERPL-MCNC: 7.3 G/DL
PROTEIN URINE: NEGATIVE MG/DL
RED BLOOD CELLS URINE: 0 /HPF
SODIUM SERPL-SCNC: 141 MMOL/L
SPECIFIC GRAVITY URINE: 1.01
TRIGL SERPL-MCNC: 60 MG/DL
TSH SERPL-ACNC: 3.95 UIU/ML
URATE SERPL-MCNC: 4.7 MG/DL
UROBILINOGEN URINE: 0.2 MG/DL
VIT B12 SERPL-MCNC: 982 PG/ML
WHITE BLOOD CELLS URINE: 0 /HPF

## 2025-02-27 ENCOUNTER — APPOINTMENT (OUTPATIENT)
Dept: ENDOCRINOLOGY | Facility: CLINIC | Age: 65
End: 2025-02-27
Payer: MEDICARE

## 2025-02-27 VITALS
SYSTOLIC BLOOD PRESSURE: 130 MMHG | HEIGHT: 64 IN | HEART RATE: 86 BPM | WEIGHT: 178 LBS | OXYGEN SATURATION: 98 % | DIASTOLIC BLOOD PRESSURE: 80 MMHG | BODY MASS INDEX: 30.39 KG/M2

## 2025-02-27 DIAGNOSIS — E04.2 NONTOXIC MULTINODULAR GOITER: ICD-10-CM

## 2025-02-27 DIAGNOSIS — E06.3 AUTOIMMUNE THYROIDITIS: ICD-10-CM

## 2025-02-27 PROCEDURE — 99214 OFFICE O/P EST MOD 30 MIN: CPT

## 2025-03-26 ENCOUNTER — APPOINTMENT (OUTPATIENT)
Dept: CARDIOLOGY | Facility: CLINIC | Age: 65
End: 2025-03-26
Payer: MEDICARE

## 2025-03-26 ENCOUNTER — NON-APPOINTMENT (OUTPATIENT)
Age: 65
End: 2025-03-26

## 2025-03-26 VITALS
WEIGHT: 176 LBS | DIASTOLIC BLOOD PRESSURE: 76 MMHG | HEIGHT: 64 IN | HEART RATE: 71 BPM | SYSTOLIC BLOOD PRESSURE: 127 MMHG | OXYGEN SATURATION: 98 % | TEMPERATURE: 97.9 F | BODY MASS INDEX: 30.05 KG/M2

## 2025-03-26 DIAGNOSIS — R03.0 ELEVATED BLOOD-PRESSURE READING, W/OUT DIAGNOSIS OF HYPERTENSION: ICD-10-CM

## 2025-03-26 PROCEDURE — G2211 COMPLEX E/M VISIT ADD ON: CPT

## 2025-03-26 PROCEDURE — 99213 OFFICE O/P EST LOW 20 MIN: CPT

## 2025-03-26 PROCEDURE — 93000 ELECTROCARDIOGRAM COMPLETE: CPT

## 2025-04-28 NOTE — ASSESSMENT
[FreeTextEntry1] : 1. HTN. Blood pressure remains elevated, even upon reevaluation. Continue to keep log for 3m. If remains elevated, increase amlodipine to 5 mg daily. 2. I personally reviewed the patient's outpatient records from the primary physician. , A1C 5.3%.  Need for statin. LDL adequately controlled. HDL >100/  f/u 3m  I spent 35 minutes on Mrs. Robles's care, including face to face time, review of previous records, and documentation. none/a

## 2025-05-01 ENCOUNTER — APPOINTMENT (OUTPATIENT)
Dept: SURGERY | Facility: CLINIC | Age: 65
End: 2025-05-01

## 2025-08-27 ENCOUNTER — APPOINTMENT (OUTPATIENT)
Dept: RADIOLOGY | Facility: CLINIC | Age: 65
End: 2025-08-27
Payer: MEDICARE

## 2025-08-27 ENCOUNTER — APPOINTMENT (OUTPATIENT)
Dept: MAMMOGRAPHY | Facility: CLINIC | Age: 65
End: 2025-08-27
Payer: MEDICARE

## 2025-08-27 PROCEDURE — 77067 SCR MAMMO BI INCL CAD: CPT

## 2025-08-27 PROCEDURE — 77063 BREAST TOMOSYNTHESIS BI: CPT

## 2025-08-27 PROCEDURE — 77080 DXA BONE DENSITY AXIAL: CPT
